# Patient Record
Sex: FEMALE | HISPANIC OR LATINO | Employment: STUDENT | URBAN - METROPOLITAN AREA
[De-identification: names, ages, dates, MRNs, and addresses within clinical notes are randomized per-mention and may not be internally consistent; named-entity substitution may affect disease eponyms.]

---

## 2024-03-22 ENCOUNTER — OFFICE VISIT (OUTPATIENT)
Dept: FAMILY MEDICINE | Facility: CLINIC | Age: 24
End: 2024-03-22
Payer: COMMERCIAL

## 2024-03-22 VITALS
RESPIRATION RATE: 16 BRPM | HEART RATE: 100 BPM | HEIGHT: 66 IN | TEMPERATURE: 99.1 F | WEIGHT: 283.4 LBS | SYSTOLIC BLOOD PRESSURE: 118 MMHG | BODY MASS INDEX: 45.55 KG/M2 | OXYGEN SATURATION: 98 % | DIASTOLIC BLOOD PRESSURE: 82 MMHG

## 2024-03-22 DIAGNOSIS — E28.2 PCOS (POLYCYSTIC OVARIAN SYNDROME): Primary | ICD-10-CM

## 2024-03-22 DIAGNOSIS — L68.0 HIRSUTISM: ICD-10-CM

## 2024-03-22 DIAGNOSIS — D50.0 IRON DEFICIENCY ANEMIA DUE TO CHRONIC BLOOD LOSS: ICD-10-CM

## 2024-03-22 DIAGNOSIS — Z23 ENCOUNTER FOR IMMUNIZATION: ICD-10-CM

## 2024-03-22 LAB
ALBUMIN SERPL BCG-MCNC: 4.3 G/DL (ref 3.5–5.2)
ALP SERPL-CCNC: 121 U/L (ref 40–150)
ALT SERPL W P-5'-P-CCNC: 31 U/L (ref 0–50)
ANION GAP SERPL CALCULATED.3IONS-SCNC: 11 MMOL/L (ref 7–15)
AST SERPL W P-5'-P-CCNC: 23 U/L (ref 0–45)
BILIRUB SERPL-MCNC: 0.7 MG/DL
BUN SERPL-MCNC: 10.1 MG/DL (ref 6–20)
CALCIUM SERPL-MCNC: 8.9 MG/DL (ref 8.6–10)
CHLORIDE SERPL-SCNC: 103 MMOL/L (ref 98–107)
CHOLEST SERPL-MCNC: 177 MG/DL
CREAT SERPL-MCNC: 0.6 MG/DL (ref 0.51–0.95)
DEPRECATED HCO3 PLAS-SCNC: 23 MMOL/L (ref 22–29)
EGFRCR SERPLBLD CKD-EPI 2021: >90 ML/MIN/1.73M2
ERYTHROCYTE [DISTWIDTH] IN BLOOD BY AUTOMATED COUNT: 18.7 % (ref 10–15)
FASTING STATUS PATIENT QL REPORTED: YES
FERRITIN SERPL-MCNC: 6 NG/ML (ref 6–175)
GLUCOSE SERPL-MCNC: 145 MG/DL (ref 70–99)
HBA1C MFR BLD: 6.1 % (ref 0–5.6)
HCT VFR BLD AUTO: 28.6 % (ref 35–47)
HDLC SERPL-MCNC: 30 MG/DL
HGB BLD-MCNC: 7.7 G/DL (ref 11.7–15.7)
IRON BINDING CAPACITY (ROCHE): 351 UG/DL (ref 240–430)
IRON SATN MFR SERPL: 4 % (ref 15–46)
IRON SERPL-MCNC: 14 UG/DL (ref 37–145)
LDLC SERPL CALC-MCNC: 114 MG/DL
MCH RBC QN AUTO: 17.3 PG (ref 26.5–33)
MCHC RBC AUTO-ENTMCNC: 26.9 G/DL (ref 31.5–36.5)
MCV RBC AUTO: 64 FL (ref 78–100)
NONHDLC SERPL-MCNC: 147 MG/DL
PLATELET # BLD AUTO: 362 10E3/UL (ref 150–450)
POTASSIUM SERPL-SCNC: 4.1 MMOL/L (ref 3.4–5.3)
PROT SERPL-MCNC: 7.2 G/DL (ref 6.4–8.3)
RBC # BLD AUTO: 4.45 10E6/UL (ref 3.8–5.2)
SHBG SERPL-SCNC: 23 NMOL/L (ref 30–135)
SODIUM SERPL-SCNC: 137 MMOL/L (ref 135–145)
TRIGL SERPL-MCNC: 165 MG/DL
TSH SERPL DL<=0.005 MIU/L-ACNC: 1.35 UIU/ML (ref 0.3–4.2)
WBC # BLD AUTO: 7.3 10E3/UL (ref 4–11)

## 2024-03-22 PROCEDURE — 83036 HEMOGLOBIN GLYCOSYLATED A1C: CPT | Performed by: FAMILY MEDICINE

## 2024-03-22 PROCEDURE — 36415 COLL VENOUS BLD VENIPUNCTURE: CPT | Performed by: FAMILY MEDICINE

## 2024-03-22 PROCEDURE — 80061 LIPID PANEL: CPT | Performed by: FAMILY MEDICINE

## 2024-03-22 PROCEDURE — 85027 COMPLETE CBC AUTOMATED: CPT | Performed by: FAMILY MEDICINE

## 2024-03-22 PROCEDURE — 80053 COMPREHEN METABOLIC PANEL: CPT | Performed by: FAMILY MEDICINE

## 2024-03-22 PROCEDURE — 91320 SARSCV2 VAC 30MCG TRS-SUC IM: CPT | Performed by: FAMILY MEDICINE

## 2024-03-22 PROCEDURE — 83550 IRON BINDING TEST: CPT | Performed by: FAMILY MEDICINE

## 2024-03-22 PROCEDURE — 90471 IMMUNIZATION ADMIN: CPT | Performed by: FAMILY MEDICINE

## 2024-03-22 PROCEDURE — 90686 IIV4 VACC NO PRSV 0.5 ML IM: CPT | Performed by: FAMILY MEDICINE

## 2024-03-22 PROCEDURE — 90480 ADMN SARSCOV2 VAC 1/ONLY CMP: CPT | Performed by: FAMILY MEDICINE

## 2024-03-22 PROCEDURE — 99204 OFFICE O/P NEW MOD 45 MIN: CPT | Mod: 25 | Performed by: FAMILY MEDICINE

## 2024-03-22 PROCEDURE — 83540 ASSAY OF IRON: CPT | Performed by: FAMILY MEDICINE

## 2024-03-22 PROCEDURE — 84270 ASSAY OF SEX HORMONE GLOBUL: CPT | Performed by: FAMILY MEDICINE

## 2024-03-22 PROCEDURE — 84403 ASSAY OF TOTAL TESTOSTERONE: CPT | Performed by: FAMILY MEDICINE

## 2024-03-22 PROCEDURE — 84443 ASSAY THYROID STIM HORMONE: CPT | Performed by: FAMILY MEDICINE

## 2024-03-22 PROCEDURE — 82728 ASSAY OF FERRITIN: CPT | Performed by: FAMILY MEDICINE

## 2024-03-22 RX ORDER — MEPERIDINE HYDROCHLORIDE 25 MG/ML
25 INJECTION INTRAMUSCULAR; INTRAVENOUS; SUBCUTANEOUS EVERY 30 MIN PRN
Status: CANCELLED | OUTPATIENT
Start: 2024-03-22

## 2024-03-22 RX ORDER — ALBUTEROL SULFATE 0.83 MG/ML
2.5 SOLUTION RESPIRATORY (INHALATION)
Status: CANCELLED | OUTPATIENT
Start: 2024-03-22

## 2024-03-22 RX ORDER — DROSPIRENONE AND ETHINYL ESTRADIOL 0.02-3(28)
1 KIT ORAL DAILY
Qty: 84 TABLET | Refills: 4 | Status: SHIPPED | OUTPATIENT
Start: 2024-03-22 | End: 2024-07-10

## 2024-03-22 RX ORDER — EPINEPHRINE 1 MG/ML
0.3 INJECTION, SOLUTION, CONCENTRATE INTRAVENOUS EVERY 5 MIN PRN
Status: CANCELLED | OUTPATIENT
Start: 2024-03-22

## 2024-03-22 RX ORDER — DIPHENHYDRAMINE HYDROCHLORIDE 50 MG/ML
50 INJECTION INTRAMUSCULAR; INTRAVENOUS
Status: CANCELLED
Start: 2024-03-22

## 2024-03-22 RX ORDER — HEPARIN SODIUM,PORCINE 10 UNIT/ML
5-20 VIAL (ML) INTRAVENOUS DAILY PRN
Status: CANCELLED | OUTPATIENT
Start: 2024-03-22

## 2024-03-22 RX ORDER — HEPARIN SODIUM (PORCINE) LOCK FLUSH IV SOLN 100 UNIT/ML 100 UNIT/ML
5 SOLUTION INTRAVENOUS
Status: CANCELLED | OUTPATIENT
Start: 2024-03-22

## 2024-03-22 RX ORDER — METHYLPREDNISOLONE SODIUM SUCCINATE 125 MG/2ML
125 INJECTION, POWDER, LYOPHILIZED, FOR SOLUTION INTRAMUSCULAR; INTRAVENOUS
Status: CANCELLED
Start: 2024-03-22

## 2024-03-22 RX ORDER — SPIRONOLACTONE 50 MG/1
50 TABLET, FILM COATED ORAL DAILY
Qty: 90 TABLET | Refills: 1 | Status: SHIPPED | OUTPATIENT
Start: 2024-03-22

## 2024-03-22 RX ORDER — ALBUTEROL SULFATE 90 UG/1
1-2 AEROSOL, METERED RESPIRATORY (INHALATION)
Status: CANCELLED
Start: 2024-03-22

## 2024-03-22 ASSESSMENT — PAIN SCALES - GENERAL: PAINLEVEL: NO PAIN (0)

## 2024-03-22 NOTE — RESULT ENCOUNTER NOTE
Dear Gina,   Your results revealed a mild elevation in your A1C. A1C is a measurement of your average blood glucose over the past 3 months. Your results are consistent with pre-diabetes. I would recommend dietary restriction of high calorie meals and avoiding simple carbohydrates to prevent progression into diabetes.     We will re-check your A1C in 3-6 months.         Best Regards  Margaret Reyes MD

## 2024-03-22 NOTE — PROGRESS NOTES
Assessment & Plan     Gina was seen today for contraception and establish care.    Diagnoses and all orders for this visit:    PCOS (polycystic ovarian syndrome)  Assessment and Plan:  Discussed polycystic ovarian syndrome with Gina Shell Ramses.   [place] criteria, two out of three of the following are required to make the diagnosis of PCOS:  ?Oligo- and/or anovulation  ?Clinical and/or biochemical signs of hyperandrogenism  ?Polycystic ovaries (by ultrasound)  other conditions that mimic PCOS must be excluded (eg, disorders that cause oligo/anovulation and/or hyperandrogenism, such as thyroid disease, nonclassic congenital adrenal hyperplasia (NCCAH), hyperprolactinemia, and androgen-secreting tumors).    Explained that polycystic ovary syndrome (PCOS) is an important cause of both menstrual irregularity and androgen excess in women. Discussed that it can cause irregular menstrual cycles, hirsutism, obesity, insulin resistance, and anovulatory infertility. Specifically explained that due to PCOS she is at increased risk of type 2 diabetes, cardiovascular disease, endometrial hyperplasia, endometrial cancer and infertility.     Counseled Gina Shell Ramses that diet and exercise for weight reduction as the first step for overweight and obese women with PCOS. Weight loss can restore ovulatory cycles and improve metabolic risk. Plan for weight loss: work on her diet, declined a referral to a nutritionist.    Discussed that oral contraceptives (OCs) are the mainstay of pharmacologic therapy for women with PCOS for managing hyperandrogenism, treating menstrual dysfunction and protecting endometrial lining. Additionally it provides contraception.     For women with PCOS who choose not to or cannot take OCs, we discussed alternative treatments for endometrial protection are intermittent or continuous progestin therapy, or a progestin-releasing intrauterine device (IUD)     Additionally discussed metformin as an  alternative that can restore ovulatory menses in approximately 50 percent of women with PCOS.     Discussed that if she is unable to conceive on her own, she benefit from medications to induce ovulation.    Patient was started on OCP and spironolactone today. Will plan to start metformin in her upcoming visit.   -     US Pelvic Complete with Transvaginal; Future  -     Hemoglobin A1c; Future  -     Testosterone Bioavailable; Future  -     CBC with platelets; Future  -     Comprehensive metabolic panel (BMP + Alb, Alk Phos, ALT, AST, Total. Bili, TP); Future  -     Ferritin; Future  -     Iron and iron binding capacity; Future  -     drospirenone-ethinyl estradiol (SLICK) 3-0.02 MG tablet; Take 1 tablet by mouth daily  -     Lipid panel reflex to direct LDL Fasting; Future  -     spironolactone (ALDACTONE) 50 MG tablet; Take 1 tablet (50 mg) by mouth daily  -     TSH with free T4 reflex; Future    Iron deficiency anemia due to chronic blood loss  Hemoglobin is 7.7mg/dl. patient is currently asymptomatic. We discussed iron infusion vs. Blood transfusion. Patient desires to proceed with iron infusion.   Plan: iron infusion vs. Blood transfusion.    -     CBC with platelets; Future  -     Ferritin; Future  -     Iron and iron binding capacity; Future    Hirsutism  -     spironolactone (ALDACTONE) 50 MG tablet; Take 1 tablet (50 mg) by mouth daily    Encounter for immunization  -     INFLUENZA VACCINE IM > 6 MONTHS VALENT IIV4 (AFLURIA/FLUZONE)  -     COVID-19 12+ (2023-24) (PFIZER)    Other orders  -     REVIEW OF HEALTH MAINTENANCE PROTOCOL ORDERS  -     PRIMARY CARE FOLLOW-UP SCHEDULING; Future\       Follow-up Visit   Expected date:  Jun 22, 2024 (Approximate)      Follow Up Appointment Details:     Follow-up with whom?: Me    Follow-Up for what?: Adult Preventive    How?: In Person                     Ines Fuentes is a 23 year old, presenting for the following health issues:  Contraception and Establish  "Care        3/22/2024     7:27 AM   Additional Questions   Roomed by Marina     History of Present Illness       Reason for visit:  PCOS and needs birth control    She eats 0-1 servings of fruits and vegetables daily.She consumes 1 sweetened beverage(s) daily.She exercises with enough effort to increase her heart rate 9 or less minutes per day.  She exercises with enough effort to increase her heart rate 3 or less days per week.   She is taking medications regularly.  23 year old who presents to the clinic to establish care. Patient desires to discuss PCOS and weight.    Menarche around age 9. Had irregular menses after. On OCP since age 12-14. Patient was on birth control for 9 years.    Stopped taking them in 2022. Initially without period for two months. Then had irregular spotting.   No acne flare ups, but she does have dark/coarse hairs on face. Has done laser hair removal in the past.     She had a significant weight gain over past few years. Has noticed weight gain despite sometimes not having much change in diet or activity.     No history of Pap or Chlamydia Screening. Never been sexually active. Declined pelvic exam today      Review of Systems  Constitutional, neuro, ENT, endocrine, pulmonary, cardiac, gastrointestinal, genitourinary, musculoskeletal, integument and psychiatric systems are negative, except as otherwise noted.      Objective    /82   Pulse 100   Temp 99.1  F (37.3  C) (Oral)   Resp 16   Ht 1.679 m (5' 6.1\")   Wt 128.5 kg (283 lb 6.4 oz)   LMP 03/01/2024 (Within Days)   SpO2 98%   Breastfeeding No   BMI 45.60 kg/m    Body mass index is 45.6 kg/m .  Physical Exam   GENERAL: alert and no distress  RESP: lungs clear to auscultation - no rales, rhonchi or wheezes  CV: regular rate and rhythm, normal S1 S2, no S3 or S4, no murmur, click or rub, no peripheral edema  SKIN: hirsitusim on back, chest, abdomen and chin.     Results for orders placed or performed in visit on 03/22/24 "   CBC with platelets     Status: Abnormal   Result Value Ref Range    WBC Count 7.3 4.0 - 11.0 10e3/uL    RBC Count 4.45 3.80 - 5.20 10e6/uL    Hemoglobin 7.7 (LL) 11.7 - 15.7 g/dL    Hematocrit 28.6 (L) 35.0 - 47.0 %    MCV 64 (L) 78 - 100 fL    MCH 17.3 (L) 26.5 - 33.0 pg    MCHC 26.9 (L) 31.5 - 36.5 g/dL    RDW 18.7 (H) 10.0 - 15.0 %    Platelet Count 362 150 - 450 10e3/uL   Hemoglobin A1c     Status: Abnormal   Result Value Ref Range    Hemoglobin A1C 6.1 (H) 0.0 - 5.6 %   Testosterone Bioavailable     Status: None (In process)    Narrative    The following orders were created for panel order Testosterone Bioavailable.  Procedure                               Abnormality         Status                     ---------                               -----------         ------                     Sex Hormone Binding Glob...[[id number]]                      In process                 Testosterone Free and Total[[id number]]                      In process                   Please view results for these tests on the individual orders.           Signed Electronically by: Margaret Reyes MD

## 2024-03-22 NOTE — RESULT ENCOUNTER NOTE
Hemoglobin is 7.7mg/dl. patient is currently asymptomatic. We discussed iron infusion vs. Blood transfusion. Patient desires to proceed with iron infusion. Treatment plan ordered.   MD Amy

## 2024-03-22 NOTE — PATIENT INSTRUCTIONS
RADIOLOGY SCHEDULING (Including Mammograms, Ultrasound, CT and MRI scans and Dexa Scans):  - [facility]: [phone]  - [facility]: [phone]  - [facility]: [phone]  - [facility]: [phone]

## 2024-03-24 LAB
TESTOST FREE SERPL-MCNC: 1.01 NG/DL
TESTOST SERPL-MCNC: 46 NG/DL (ref 8–60)

## 2024-03-25 ENCOUNTER — TELEPHONE (OUTPATIENT)
Dept: FAMILY MEDICINE | Facility: CLINIC | Age: 24
End: 2024-03-25
Payer: COMMERCIAL

## 2024-03-25 NOTE — RESULT ENCOUNTER NOTE
Dear Gina,   SHBG is low. This is consistent with your diagnosis of PCOS. As we expected, your Iron level is very low. Proceed with iron infusion.    Best Regards  Margaret Reyes MD

## 2024-03-25 NOTE — TELEPHONE ENCOUNTER
Pt calling to see what pharmacy rx sent to - seng [phone] vanessa APONTE RN  [facility]

## 2024-04-05 ENCOUNTER — ANCILLARY PROCEDURE (OUTPATIENT)
Dept: ULTRASOUND IMAGING | Facility: CLINIC | Age: 24
End: 2024-04-05
Attending: FAMILY MEDICINE
Payer: COMMERCIAL

## 2024-04-05 DIAGNOSIS — E28.2 PCOS (POLYCYSTIC OVARIAN SYNDROME): ICD-10-CM

## 2024-04-05 PROCEDURE — 76856 US EXAM PELVIC COMPLETE: CPT | Mod: GC | Performed by: STUDENT IN AN ORGANIZED HEALTH CARE EDUCATION/TRAINING PROGRAM

## 2024-04-12 ENCOUNTER — INFUSION THERAPY VISIT (OUTPATIENT)
Dept: INFUSION THERAPY | Facility: CLINIC | Age: 24
End: 2024-04-12
Attending: FAMILY MEDICINE
Payer: COMMERCIAL

## 2024-04-12 VITALS
SYSTOLIC BLOOD PRESSURE: 134 MMHG | TEMPERATURE: 97.8 F | HEART RATE: 98 BPM | RESPIRATION RATE: 16 BRPM | DIASTOLIC BLOOD PRESSURE: 91 MMHG

## 2024-04-12 DIAGNOSIS — D50.0 IRON DEFICIENCY ANEMIA DUE TO CHRONIC BLOOD LOSS: Primary | ICD-10-CM

## 2024-04-12 PROCEDURE — 258N000003 HC RX IP 258 OP 636: Performed by: FAMILY MEDICINE

## 2024-04-12 PROCEDURE — 250N000011 HC RX IP 250 OP 636: Performed by: FAMILY MEDICINE

## 2024-04-12 PROCEDURE — 96366 THER/PROPH/DIAG IV INF ADDON: CPT

## 2024-04-12 PROCEDURE — 96365 THER/PROPH/DIAG IV INF INIT: CPT

## 2024-04-12 RX ORDER — MEPERIDINE HYDROCHLORIDE 25 MG/ML
25 INJECTION INTRAMUSCULAR; INTRAVENOUS; SUBCUTANEOUS EVERY 30 MIN PRN
Status: CANCELLED | OUTPATIENT
Start: 2024-04-14

## 2024-04-12 RX ORDER — ALBUTEROL SULFATE 0.83 MG/ML
2.5 SOLUTION RESPIRATORY (INHALATION)
Status: CANCELLED | OUTPATIENT
Start: 2024-04-14

## 2024-04-12 RX ORDER — DIPHENHYDRAMINE HYDROCHLORIDE 50 MG/ML
50 INJECTION INTRAMUSCULAR; INTRAVENOUS
Status: CANCELLED
Start: 2024-04-14

## 2024-04-12 RX ORDER — METHYLPREDNISOLONE SODIUM SUCCINATE 125 MG/2ML
125 INJECTION, POWDER, LYOPHILIZED, FOR SOLUTION INTRAMUSCULAR; INTRAVENOUS
Status: CANCELLED
Start: 2024-04-14

## 2024-04-12 RX ORDER — EPINEPHRINE 1 MG/ML
0.3 INJECTION, SOLUTION INTRAMUSCULAR; SUBCUTANEOUS EVERY 5 MIN PRN
Status: CANCELLED | OUTPATIENT
Start: 2024-04-14

## 2024-04-12 RX ORDER — HEPARIN SODIUM,PORCINE 10 UNIT/ML
5-20 VIAL (ML) INTRAVENOUS DAILY PRN
Status: CANCELLED | OUTPATIENT
Start: 2024-04-14

## 2024-04-12 RX ORDER — HEPARIN SODIUM (PORCINE) LOCK FLUSH IV SOLN 100 UNIT/ML 100 UNIT/ML
5 SOLUTION INTRAVENOUS
Status: CANCELLED | OUTPATIENT
Start: 2024-04-14

## 2024-04-12 RX ORDER — ALBUTEROL SULFATE 90 UG/1
1-2 AEROSOL, METERED RESPIRATORY (INHALATION)
Status: CANCELLED
Start: 2024-04-14

## 2024-04-12 RX ADMIN — IRON SUCROSE 300 MG: 20 INJECTION, SOLUTION INTRAVENOUS at 13:33

## 2024-04-12 RX ADMIN — SODIUM CHLORIDE 250 ML: 9 INJECTION, SOLUTION INTRAVENOUS at 13:33

## 2024-04-12 ASSESSMENT — PAIN SCALES - GENERAL: PAINLEVEL: NO PAIN (0)

## 2024-04-12 NOTE — PROGRESS NOTES
Infusion Nursing Note:  Gina Pearce presents today for venofer 1/3.    Patient seen by provider today: No   present during visit today: Not Applicable.    Note: N/A.      Intravenous Access:  Peripheral IV placed.    Treatment Conditions:  Not Applicable.      Post Infusion Assessment:  Patient tolerated infusion without incident.  Site patent and intact, free from redness, edema or discomfort.  No evidence of extravasations.  Access discontinued per protocol.       Discharge Plan:   Patient and/or family verbalized understanding of discharge instructions and all questions answered.  AVS to patient via [employer].  Patient will return 4/15 for next appointment.   Patient discharged in stable condition accompanied by: self.  Departure Mode: Ambulatory.      Ladonna Walden RN

## 2024-04-15 ENCOUNTER — INFUSION THERAPY VISIT (OUTPATIENT)
Dept: INFUSION THERAPY | Facility: CLINIC | Age: 24
End: 2024-04-15
Payer: COMMERCIAL

## 2024-04-15 VITALS
DIASTOLIC BLOOD PRESSURE: 87 MMHG | HEART RATE: 103 BPM | TEMPERATURE: 97.9 F | SYSTOLIC BLOOD PRESSURE: 139 MMHG | RESPIRATION RATE: 16 BRPM

## 2024-04-15 DIAGNOSIS — D50.0 IRON DEFICIENCY ANEMIA DUE TO CHRONIC BLOOD LOSS: Primary | ICD-10-CM

## 2024-04-15 PROCEDURE — 258N000003 HC RX IP 258 OP 636: Performed by: FAMILY MEDICINE

## 2024-04-15 PROCEDURE — 96365 THER/PROPH/DIAG IV INF INIT: CPT

## 2024-04-15 PROCEDURE — 250N000011 HC RX IP 250 OP 636: Performed by: FAMILY MEDICINE

## 2024-04-15 PROCEDURE — 96366 THER/PROPH/DIAG IV INF ADDON: CPT

## 2024-04-15 RX ORDER — MEPERIDINE HYDROCHLORIDE 25 MG/ML
25 INJECTION INTRAMUSCULAR; INTRAVENOUS; SUBCUTANEOUS EVERY 30 MIN PRN
Status: CANCELLED | OUTPATIENT
Start: 2024-04-16

## 2024-04-15 RX ORDER — ALBUTEROL SULFATE 0.83 MG/ML
2.5 SOLUTION RESPIRATORY (INHALATION)
Status: CANCELLED | OUTPATIENT
Start: 2024-04-16

## 2024-04-15 RX ORDER — ALBUTEROL SULFATE 90 UG/1
1-2 AEROSOL, METERED RESPIRATORY (INHALATION)
Status: CANCELLED
Start: 2024-04-16

## 2024-04-15 RX ORDER — METHYLPREDNISOLONE SODIUM SUCCINATE 125 MG/2ML
125 INJECTION, POWDER, LYOPHILIZED, FOR SOLUTION INTRAMUSCULAR; INTRAVENOUS
Status: CANCELLED
Start: 2024-04-16

## 2024-04-15 RX ORDER — HEPARIN SODIUM (PORCINE) LOCK FLUSH IV SOLN 100 UNIT/ML 100 UNIT/ML
5 SOLUTION INTRAVENOUS
Status: CANCELLED | OUTPATIENT
Start: 2024-04-16

## 2024-04-15 RX ORDER — DIPHENHYDRAMINE HYDROCHLORIDE 50 MG/ML
50 INJECTION INTRAMUSCULAR; INTRAVENOUS
Status: CANCELLED
Start: 2024-04-16

## 2024-04-15 RX ORDER — EPINEPHRINE 1 MG/ML
0.3 INJECTION, SOLUTION INTRAMUSCULAR; SUBCUTANEOUS EVERY 5 MIN PRN
Status: CANCELLED | OUTPATIENT
Start: 2024-04-16

## 2024-04-15 RX ORDER — HEPARIN SODIUM,PORCINE 10 UNIT/ML
5-20 VIAL (ML) INTRAVENOUS DAILY PRN
Status: CANCELLED | OUTPATIENT
Start: 2024-04-16

## 2024-04-15 RX ADMIN — IRON SUCROSE 300 MG: 20 INJECTION, SOLUTION INTRAVENOUS at 13:49

## 2024-04-15 RX ADMIN — SODIUM CHLORIDE 250 ML: 9 INJECTION, SOLUTION INTRAVENOUS at 13:49

## 2024-04-15 ASSESSMENT — PAIN SCALES - GENERAL: PAINLEVEL: NO PAIN (0)

## 2024-04-15 NOTE — PROGRESS NOTES
Infusion Nursing Note:  Gina Pearce presents today for venofer 2/3.    Patient seen by provider today: No   present during visit today: Not Applicable.    Note: pt denies any changes in health since last visit. Reports tolerating first dose of iron without any issues.      Intravenous Access:  Peripheral IV placed.    Treatment Conditions:  Not Applicable.      Post Infusion Assessment:  Patient tolerated infusion without incident.  Site patent and intact, free from redness, edema or discomfort.  No evidence of extravasations.  Access discontinued per protocol.       Discharge Plan:   Patient and/or family verbalized understanding of discharge instructions and all questions answered.  AVS to patient via [employer].  Patient will return 4/18 for next appointment.   Patient discharged in stable condition accompanied by: self.  Departure Mode: Ambulatory.      Ladonna Walden RN

## 2024-04-18 ENCOUNTER — INFUSION THERAPY VISIT (OUTPATIENT)
Dept: INFUSION THERAPY | Facility: CLINIC | Age: 24
End: 2024-04-18
Attending: FAMILY MEDICINE
Payer: COMMERCIAL

## 2024-04-18 VITALS
SYSTOLIC BLOOD PRESSURE: 129 MMHG | DIASTOLIC BLOOD PRESSURE: 83 MMHG | HEART RATE: 83 BPM | OXYGEN SATURATION: 98 % | RESPIRATION RATE: 18 BRPM | TEMPERATURE: 97.4 F

## 2024-04-18 DIAGNOSIS — D50.0 IRON DEFICIENCY ANEMIA DUE TO CHRONIC BLOOD LOSS: Primary | ICD-10-CM

## 2024-04-18 PROCEDURE — 96365 THER/PROPH/DIAG IV INF INIT: CPT

## 2024-04-18 PROCEDURE — 258N000003 HC RX IP 258 OP 636: Performed by: FAMILY MEDICINE

## 2024-04-18 PROCEDURE — 96366 THER/PROPH/DIAG IV INF ADDON: CPT

## 2024-04-18 PROCEDURE — 250N000011 HC RX IP 250 OP 636: Performed by: FAMILY MEDICINE

## 2024-04-18 RX ORDER — HEPARIN SODIUM,PORCINE 10 UNIT/ML
5-20 VIAL (ML) INTRAVENOUS DAILY PRN
OUTPATIENT
Start: 2024-04-19

## 2024-04-18 RX ORDER — ALBUTEROL SULFATE 0.83 MG/ML
2.5 SOLUTION RESPIRATORY (INHALATION)
OUTPATIENT
Start: 2024-04-19

## 2024-04-18 RX ORDER — HEPARIN SODIUM (PORCINE) LOCK FLUSH IV SOLN 100 UNIT/ML 100 UNIT/ML
5 SOLUTION INTRAVENOUS
OUTPATIENT
Start: 2024-04-19

## 2024-04-18 RX ORDER — MEPERIDINE HYDROCHLORIDE 25 MG/ML
25 INJECTION INTRAMUSCULAR; INTRAVENOUS; SUBCUTANEOUS EVERY 30 MIN PRN
OUTPATIENT
Start: 2024-04-19

## 2024-04-18 RX ORDER — DIPHENHYDRAMINE HYDROCHLORIDE 50 MG/ML
50 INJECTION INTRAMUSCULAR; INTRAVENOUS
Start: 2024-04-19

## 2024-04-18 RX ORDER — ALBUTEROL SULFATE 90 UG/1
1-2 AEROSOL, METERED RESPIRATORY (INHALATION)
Start: 2024-04-19

## 2024-04-18 RX ORDER — METHYLPREDNISOLONE SODIUM SUCCINATE 125 MG/2ML
125 INJECTION, POWDER, LYOPHILIZED, FOR SOLUTION INTRAMUSCULAR; INTRAVENOUS
Start: 2024-04-19

## 2024-04-18 RX ORDER — EPINEPHRINE 1 MG/ML
0.3 INJECTION, SOLUTION INTRAMUSCULAR; SUBCUTANEOUS EVERY 5 MIN PRN
OUTPATIENT
Start: 2024-04-19

## 2024-04-18 RX ADMIN — SODIUM CHLORIDE 250 ML: 9 INJECTION, SOLUTION INTRAVENOUS at 12:12

## 2024-04-18 RX ADMIN — IRON SUCROSE 300 MG: 20 INJECTION, SOLUTION INTRAVENOUS at 12:12

## 2024-04-18 NOTE — PROGRESS NOTES
Infusion Nursing Note:  Gina Pearce presents today for Venofer 300 mg 3 of 3.    Patient seen by provider today: No   present during visit today: Not Applicable.    Note: N/A.      Intravenous Access:  Peripheral IV placed.    Treatment Conditions:  Not Applicable.      Post Infusion Assessment:  Patient tolerated infusion without incident.  Blood return noted pre and post infusion.  Site patent and intact, free from redness, edema or discomfort.  No evidence of extravasations.  Access discontinued per protocol.       Discharge Plan:   Patient declined prescription refills.  Discharge instructions reviewed with: Patient.  Patient and/or family verbalized understanding of discharge instructions and all questions answered.  AVS to patient via [facility].    Patient discharged in stable condition accompanied by: self.  Departure Mode: Ambulatory.      Chandler Howard RN

## 2024-05-19 ENCOUNTER — HEALTH MAINTENANCE LETTER (OUTPATIENT)
Age: 24
End: 2024-05-19

## 2024-07-10 ENCOUNTER — OFFICE VISIT (OUTPATIENT)
Dept: FAMILY MEDICINE | Facility: CLINIC | Age: 24
End: 2024-07-10
Payer: COMMERCIAL

## 2024-07-10 VITALS
RESPIRATION RATE: 16 BRPM | DIASTOLIC BLOOD PRESSURE: 87 MMHG | BODY MASS INDEX: 43.07 KG/M2 | SYSTOLIC BLOOD PRESSURE: 115 MMHG | WEIGHT: 268 LBS | OXYGEN SATURATION: 99 % | HEIGHT: 66 IN | HEART RATE: 98 BPM | TEMPERATURE: 97.3 F

## 2024-07-10 DIAGNOSIS — D50.8 IRON DEFICIENCY ANEMIA SECONDARY TO INADEQUATE DIETARY IRON INTAKE: ICD-10-CM

## 2024-07-10 DIAGNOSIS — Z12.4 CERVICAL CANCER SCREENING: ICD-10-CM

## 2024-07-10 DIAGNOSIS — Z11.59 NEED FOR HEPATITIS C SCREENING TEST: ICD-10-CM

## 2024-07-10 DIAGNOSIS — N93.8 DUB (DYSFUNCTIONAL UTERINE BLEEDING): ICD-10-CM

## 2024-07-10 DIAGNOSIS — E28.2 PCOS (POLYCYSTIC OVARIAN SYNDROME): Primary | ICD-10-CM

## 2024-07-10 DIAGNOSIS — R73.03 PREDIABETES: ICD-10-CM

## 2024-07-10 DIAGNOSIS — Z11.4 SCREENING FOR HIV (HUMAN IMMUNODEFICIENCY VIRUS): ICD-10-CM

## 2024-07-10 LAB
CHOLEST SERPL-MCNC: 183 MG/DL
ERYTHROCYTE [DISTWIDTH] IN BLOOD BY AUTOMATED COUNT: 19.3 % (ref 10–15)
FASTING STATUS PATIENT QL REPORTED: YES
FERRITIN SERPL-MCNC: 6 NG/ML (ref 6–175)
HBA1C MFR BLD: 6 % (ref 0–5.6)
HCT VFR BLD AUTO: 30.7 % (ref 35–47)
HCV AB SERPL QL IA: NONREACTIVE
HDLC SERPL-MCNC: 36 MG/DL
HGB BLD-MCNC: 8.6 G/DL (ref 11.7–15.7)
HIV 1+2 AB+HIV1 P24 AG SERPL QL IA: NONREACTIVE
IRON BINDING CAPACITY (ROCHE): 401 UG/DL (ref 240–430)
IRON SATN MFR SERPL: 5 % (ref 15–46)
IRON SERPL-MCNC: 20 UG/DL (ref 37–145)
LDLC SERPL CALC-MCNC: 114 MG/DL
MCH RBC QN AUTO: 18.9 PG (ref 26.5–33)
MCHC RBC AUTO-ENTMCNC: 28 G/DL (ref 31.5–36.5)
MCV RBC AUTO: 68 FL (ref 78–100)
NONHDLC SERPL-MCNC: 147 MG/DL
PLATELET # BLD AUTO: 413 10E3/UL (ref 150–450)
RBC # BLD AUTO: 4.54 10E6/UL (ref 3.8–5.2)
RETICS # AUTO: 0.12 10E6/UL (ref 0.03–0.1)
RETICS/RBC NFR AUTO: 2.6 % (ref 0.5–2)
TRIGL SERPL-MCNC: 164 MG/DL
WBC # BLD AUTO: 6.1 10E3/UL (ref 4–11)

## 2024-07-10 PROCEDURE — 86803 HEPATITIS C AB TEST: CPT | Performed by: FAMILY MEDICINE

## 2024-07-10 PROCEDURE — G2211 COMPLEX E/M VISIT ADD ON: HCPCS | Performed by: FAMILY MEDICINE

## 2024-07-10 PROCEDURE — 80061 LIPID PANEL: CPT | Performed by: FAMILY MEDICINE

## 2024-07-10 PROCEDURE — 85027 COMPLETE CBC AUTOMATED: CPT | Performed by: FAMILY MEDICINE

## 2024-07-10 PROCEDURE — 36415 COLL VENOUS BLD VENIPUNCTURE: CPT | Performed by: FAMILY MEDICINE

## 2024-07-10 PROCEDURE — 82728 ASSAY OF FERRITIN: CPT | Performed by: FAMILY MEDICINE

## 2024-07-10 PROCEDURE — 99214 OFFICE O/P EST MOD 30 MIN: CPT | Performed by: FAMILY MEDICINE

## 2024-07-10 PROCEDURE — 85045 AUTOMATED RETICULOCYTE COUNT: CPT | Performed by: FAMILY MEDICINE

## 2024-07-10 PROCEDURE — 87389 HIV-1 AG W/HIV-1&-2 AB AG IA: CPT | Performed by: FAMILY MEDICINE

## 2024-07-10 PROCEDURE — 83550 IRON BINDING TEST: CPT | Performed by: FAMILY MEDICINE

## 2024-07-10 PROCEDURE — 83036 HEMOGLOBIN GLYCOSYLATED A1C: CPT | Performed by: FAMILY MEDICINE

## 2024-07-10 PROCEDURE — 83540 ASSAY OF IRON: CPT | Performed by: FAMILY MEDICINE

## 2024-07-10 RX ORDER — DROSPIRENONE AND ETHINYL ESTRADIOL 0.02-3(28)
1 KIT ORAL DAILY
Qty: 84 TABLET | Refills: 4 | Status: SHIPPED | OUTPATIENT
Start: 2024-07-10 | End: 2024-08-20

## 2024-07-10 RX ORDER — METFORMIN HCL 500 MG
500 TABLET, EXTENDED RELEASE 24 HR ORAL
Qty: 90 TABLET | Refills: 3 | Status: SHIPPED | OUTPATIENT
Start: 2024-07-10

## 2024-07-10 RX ORDER — FERROUS GLUCONATE 324(38)MG
324 TABLET ORAL
Qty: 90 TABLET | Refills: 3 | Status: SHIPPED | OUTPATIENT
Start: 2024-07-10 | End: 2024-08-20

## 2024-07-10 ASSESSMENT — PAIN SCALES - GENERAL: PAINLEVEL: NO PAIN (0)

## 2024-07-10 NOTE — RESULT ENCOUNTER NOTE
Dear Gina,   Your hemoglobin improved from 7.7-8.6.  Please continue with iron supplement your hemoglobin A1c continues to be elevated.  This is consistent with prediabetes.    Earlier today we agreed on starting metformin.     Please schedule a follow-up in 3 to 6 months    Best Regards  Margaret Reyes MD

## 2024-07-10 NOTE — PROGRESS NOTES
Assessment & Plan     Gina was seen today for anemia and pcos.    Diagnoses and all orders for this visit:    DUB (dysfunctional uterine bleeding)  PCOS (polycystic ovarian syndrome)  Frequency of bleeding improved significantly after starting a combined oral contraceptive.  Other options were discussed including hormonal IUD but patient is not interested in that at this time.  She desires to continue with her current prescription.  -     Hemoglobin A1c; Future  -     Lipid panel reflex to direct LDL Fasting; Future  -     metFORMIN (GLUCOPHAGE XR) 500 MG 24 hr tablet; Take 1 tablet (500 mg) by mouth daily (with dinner)  -     drospirenone-ethinyl estradiol (SLICK) 3-0.02 MG tablet; Take 1 tablet by mouth daily  -     ferrous gluconate (FERGON) 324 (38 Fe) MG tablet; Take 1 tablet (324 mg) by mouth daily (with breakfast)    Iron deficiency anemia secondary to inadequate dietary iron intake  In our previous visit, patient had severe iron deficiency anemia.  She underwent iron infusions today's labs revealed an improvement in her hemoglobin level..  I advised her to continue with iron supplements.  -     Ferritin; Future  -     Iron and iron binding capacity; Future  -     CBC with platelets; Future  -     Reticulocyte count; Future  -     ferrous gluconate (FERGON) 324 (38 Fe) MG tablet; Take 1 tablet (324 mg) by mouth daily (with breakfast)    Prediabetes  We had an extended discussion regarding PCOS, prediabetes and type 2 diabetes.  We mutually agreed to start a low-dose metformin today.  I would recommend increasing her dose to 1000 mg once daily or 500 mg XR 2 times daily.  -     Hemoglobin A1c; Future  -     Lipid panel reflex to direct LDL Fasting; Future  -     metFORMIN (GLUCOPHAGE XR) 500 MG 24 hr tablet; Take 1 tablet (500 mg) by mouth daily (with dinner)    Screening for HIV (human immunodeficiency virus)  -     HIV Antigen Antibody Combo; Future    Need for hepatitis C screening test  -     Hepatitis C  "Screen Reflex to HCV RNA Quant and Genotype; Future    Cervical cancer screening  Patient desires to return to clinic for cervical cancer screening.    BMI  Estimated body mass index is 43.26 kg/m  as calculated from the following:    Height as of this encounter: 1.676 m (5' 6\").    Weight as of this encounter: 121.6 kg (268 lb).     Ines Fuentes is a 24 year old, presenting for the following health issues:  Anemia and PCOS        7/10/2024     9:34 AM   Additional Questions   Roomed by Bib VALLADARES     History of Present Illness       Reason for visit:  Follow up from last visit regarding iron deficiency and pcos    She eats 2-3 servings of fruits and vegetables daily.She consumes 0 sweetened beverage(s) daily.She exercises with enough effort to increase her heart rate 10 to 19 minutes per day.  She exercises with enough effort to increase her heart rate 3 or less days per week.   She is taking medications regularly.     Patient presented to clinic previously with complains of heavy persistent menstrual cycles secondary to PCOS. Patient was started the oral contraceptive. She had three weeks of amenorrhea, followed by another week of bleeding despite taking the oral contraceptive.  Frequency of bleeding and amount of bleeding decreased significantly since starting OCPs.    Previously it was persistent heavy bleeding constantly.     FDLMP: 06/26/2024 lasted for 2 weeks (one heavy flow and another week of spotting).     Also, patient was diagnosed with iron deficiency anemia.  She underwent multiple iron infusions and reports moderate increase in her energy level.      Review of Systems  Constitutional, neuro, ENT, endocrine, pulmonary, cardiac, gastrointestinal, genitourinary, musculoskeletal, integument and psychiatric systems are negative, except as otherwise noted.      Objective    /87   Pulse 98   Temp 97.3  F (36.3  C) (Temporal)   Resp 16   Ht 1.676 m (5' 6\")   Wt 121.6 kg (268 lb)   LMP " 06/26/2024 (Within Days)   SpO2 99%   BMI 43.26 kg/m    Body mass index is 43.26 kg/m .  Physical Exam   GENERAL: alert and no distress  EYES: Eyes grossly normal to inspection, PERRL and conjunctivae and sclerae normal  HENT: ear canals and TM's normal, nose and mouth without ulcers or lesions  NECK: no adenopathy, no asymmetry, masses, or scars  RESP: lungs clear to auscultation - no rales, rhonchi or wheezes  CV: regular rate and rhythm, normal S1 S2, no S3 or S4, no murmur, click or rub, no peripheral edema  ABDOMEN: soft, nontender, no hepatosplenomegaly, no masses and bowel sounds normal  MS: no gross musculoskeletal defects noted, no edema  SKIN: no suspicious lesions or rashes  NEURO: Normal strength and tone, mentation intact and speech normal  PSYCH: mentation appears normal, affect normal/bright    Results for orders placed or performed in visit on 07/10/24   Hemoglobin A1c     Status: Abnormal   Result Value Ref Range    Hemoglobin A1C 6.0 (H) 0.0 - 5.6 %   CBC with platelets     Status: Abnormal   Result Value Ref Range    WBC Count 6.1 4.0 - 11.0 10e3/uL    RBC Count 4.54 3.80 - 5.20 10e6/uL    Hemoglobin 8.6 (L) 11.7 - 15.7 g/dL    Hematocrit 30.7 (L) 35.0 - 47.0 %    MCV 68 (L) 78 - 100 fL    MCH 18.9 (L) 26.5 - 33.0 pg    MCHC 28.0 (L) 31.5 - 36.5 g/dL    RDW 19.3 (H) 10.0 - 15.0 %    Platelet Count 413 150 - 450 10e3/uL           Signed Electronically by: Margaret Reyes MD

## 2024-07-10 NOTE — LETTER
August 12, 2024      Gina Shell Ramses  [address]  [facility] [address]        Dear Ms.Rivera Pearce,    We are writing to inform you of your test results.    We sent you a TUTORize message but you have not viewed it yet. Here is a copy of the [employer]OR[employer] message and your lab results.     Results for orders placed or performed in visit on 07/10/24   HIV Antigen Antibody Combo     Status: Normal   Result Value Ref Range    HIV Antigen Antibody Combo Nonreactive Nonreactive   Hepatitis C Screen Reflex to HCV RNA Quant and Genotype     Status: Normal   Result Value Ref Range    Hepatitis C Antibody Nonreactive Nonreactive   Hemoglobin A1c     Status: Abnormal   Result Value Ref Range    Hemoglobin A1C 6.0 (H) 0.0 - 5.6 %   Ferritin     Status: Normal   Result Value Ref Range    Ferritin 6 6 - 175 ng/mL   Iron and iron binding capacity     Status: Abnormal   Result Value Ref Range    Iron 20 (L) 37 - 145 ug/dL    Iron Binding Capacity 401 240 - 430 ug/dL    Iron Sat Index 5 (L) 15 - 46 %   CBC with platelets     Status: Abnormal   Result Value Ref Range    WBC Count 6.1 4.0 - 11.0 10e3/uL    RBC Count 4.54 3.80 - 5.20 10e6/uL    Hemoglobin 8.6 (L) 11.7 - 15.7 g/dL    Hematocrit 30.7 (L) 35.0 - 47.0 %    MCV 68 (L) 78 - 100 fL    MCH 18.9 (L) 26.5 - 33.0 pg    MCHC 28.0 (L) 31.5 - 36.5 g/dL    RDW 19.3 (H) 10.0 - 15.0 %    Platelet Count 413 150 - 450 10e3/uL   Reticulocyte count     Status: Abnormal   Result Value Ref Range    % Reticulocyte 2.6 (H) 0.5 - 2.0 %    Absolute Reticulocyte 0.117 (H) 0.025 - 0.095 10e6/uL   Lipid panel reflex to direct LDL Fasting     Status: Abnormal   Result Value Ref Range    Cholesterol 183 <200 mg/dL    Triglycerides 164 (H) <150 mg/dL    Direct Measure HDL 36 (L) >=50 mg/dL    LDL Cholesterol Calculated 114 (H) <=100 mg/dL    Non HDL Cholesterol 147 (H) <130 mg/dL    Patient Fasting > 8hrs? Yes     Narrative    Cholesterol  Desirable:  <200 mg/dL    Triglycerides  Normal:   Less than 150 mg/dL  Borderline High:  150-199 mg/dL  High:  200-499 mg/dL  Very High:  Greater than or equal to 500 mg/dL    Direct Measure HDL  Female:  Greater than or equal to 50 mg/dL   Male:  Greater than or equal to 40 mg/dL    LDL Cholesterol  Desirable:  <100mg/dL  Above Desirable:  100-129 mg/dL   Borderline High:  130-159 mg/dL   High:  160-189 mg/dL   Very High:  >= 190 mg/dL    Non HDL Cholesterol  Desirable:  130 mg/dL  Above Desirable:  130-159 mg/dL  Borderline High:  160-189 mg/dL  High:  190-219 mg/dL  Very High:  Greater than or equal to 220 mg/dL         If you have any questions or concerns, please call the clinic at the number listed above.       Sincerely,      Margaret Reyes MD

## 2024-07-10 NOTE — PATIENT INSTRUCTIONS
Iron Deficiency   Shoot for foods with highly concentrated sources of iron.  As you may know, you absorb up to 30 percent of heme iron, found only in animal tissues (meat, poultry, and fish). You absorb 2-10 percent of non-heme iron, found in plant foods as well as meat.  Eating meat generally boost your iron levels far more than eating non-heme iron. When you eat heme iron with other sources of non-heme iron, the iron is more completely absorbed. Foods high in vitamin C, like tomatoes, citrus fruits and red, yellow and orange peppers can also help with the absorption of non-heme iron.  As far as supplements go, he best tolerated iron supplements in my practice typically are iron containing children's chewable or gummy vitamins (Candy!).  Also additional oral B12 and Folic acid supplements can help you absorb iron better.     It is going to take at least a month to build your blood supply back up a significant amount.  You could come back in for a recheck then.    Iron Rich Foods  Food has two types of iron -- heme and non-heme iron. Heme iron is found in meat, fish and poultry. It is the form of iron that is most readily absorbed by your body. You absorb up to 30 percent of the heme iron that you consume. Eating meat generally boosts your iron levels far more than eating non-heme iron.  Non-heme iron is found in plant-based foods such as fruits, vegetables and nuts. Foods with non-heme iron are still an important part of a nutritious, well-balanced diet, but the iron contained in these foods won t be absorbed as completely. You absorb between two and 10 percent of the non-heme iron that you consume.  When you eat heme iron with foods higher in non-heme iron, the iron will be more completely absorbed by your body. Foods high in vitamin C - like tomatoes, citrus fruits and red, yellow and orange peppers - can also help with the absorption of non-heme iron.  The amount and type of iron in your diet is important. Some  iron-rich foods are:  Meat and Eggs  Beef   Lamb   Ham   Turkey   Chicken   Veal   Pork   Dried beef   Liver   Liverwurst   Eggs (any style)  Seafood  Shrimp   Clams   Scallops   Oysters   Tuna   Sardines   Yusuf   Mackerel  Vegetables  Spinach   Sweet potatoes   Peas   Broccoli   String beans   Beet greens   Dandelion greens   Collards   Kale   Chard  Bread and Cereals  White bread (enriched)   Whole wheat bread   Enriched pasta   Wheat products   Bran cereals   Corn meal   Oat cereal   Cream of Wheat   Rye bread   Enriched rice  Fruit  Strawberries   Watermelon   Raisins   Dates   Figs   Prunes   Prune juice   Dried apricots   Dried peaches  Beans and Other Foods  Tofu   Beans (kidney, garbanzo, or white, canned)   Tomato products (e.g., paste)   Dried peas   Dried beans   Lentils   Instant breakfast   Corn syrup   Maple syrup   Molasses

## 2024-07-11 NOTE — RESULT ENCOUNTER NOTE
Dear Gina,   Your iron level and your iron saturation are very low.  Let me know if you would like to proceed with another iron infusion and I will place orders in your chart.    Best Regards  Margaret Reyes MD

## 2024-08-20 ENCOUNTER — TELEPHONE (OUTPATIENT)
Dept: FAMILY MEDICINE | Facility: CLINIC | Age: 24
End: 2024-08-20
Payer: COMMERCIAL

## 2024-08-20 DIAGNOSIS — E28.2 PCOS (POLYCYSTIC OVARIAN SYNDROME): ICD-10-CM

## 2024-08-20 DIAGNOSIS — D50.8 IRON DEFICIENCY ANEMIA SECONDARY TO INADEQUATE DIETARY IRON INTAKE: ICD-10-CM

## 2024-08-20 RX ORDER — FERROUS GLUCONATE 324(38)MG
324 TABLET ORAL
Qty: 90 TABLET | Refills: 2 | Status: SHIPPED | OUTPATIENT
Start: 2024-08-20

## 2024-08-20 RX ORDER — DROSPIRENONE AND ETHINYL ESTRADIOL 0.02-3(28)
1 KIT ORAL DAILY
Qty: 84 TABLET | Refills: 2 | Status: SHIPPED | OUTPATIENT
Start: 2024-08-20

## 2024-08-20 NOTE — TELEPHONE ENCOUNTER
Change in pharmacy request. Resent remaining refills on file.     Karthikeyan VOALLE RN 8/20/2024 at 3:36 PM

## 2024-08-20 NOTE — TELEPHONE ENCOUNTER
Patient calling in requesting refills for Leilani. Patient would like to update pharmacy on file to [facility] - [id number], [place] [id number] location.     Dyan   Lead

## 2024-10-11 ENCOUNTER — MYC REFILL (OUTPATIENT)
Dept: FAMILY MEDICINE | Facility: CLINIC | Age: 24
End: 2024-10-11
Payer: COMMERCIAL

## 2024-10-11 DIAGNOSIS — E28.2 PCOS (POLYCYSTIC OVARIAN SYNDROME): ICD-10-CM

## 2024-10-11 DIAGNOSIS — L68.0 HIRSUTISM: ICD-10-CM

## 2024-10-11 RX ORDER — SPIRONOLACTONE 50 MG/1
50 TABLET, FILM COATED ORAL DAILY
Qty: 90 TABLET | Refills: 1 | OUTPATIENT
Start: 2024-10-11

## 2024-10-11 RX ORDER — SPIRONOLACTONE 50 MG/1
50 TABLET, FILM COATED ORAL DAILY
Qty: 90 TABLET | Refills: 0 | Status: SHIPPED | OUTPATIENT
Start: 2024-10-11

## 2024-11-15 ENCOUNTER — OFFICE VISIT (OUTPATIENT)
Dept: FAMILY MEDICINE | Facility: CLINIC | Age: 24
End: 2024-11-15
Payer: COMMERCIAL

## 2024-11-15 VITALS
WEIGHT: 271 LBS | SYSTOLIC BLOOD PRESSURE: 123 MMHG | BODY MASS INDEX: 43.55 KG/M2 | DIASTOLIC BLOOD PRESSURE: 84 MMHG | OXYGEN SATURATION: 97 % | RESPIRATION RATE: 16 BRPM | HEIGHT: 66 IN | TEMPERATURE: 96.9 F | HEART RATE: 86 BPM

## 2024-11-15 DIAGNOSIS — D50.8 IRON DEFICIENCY ANEMIA SECONDARY TO INADEQUATE DIETARY IRON INTAKE: ICD-10-CM

## 2024-11-15 DIAGNOSIS — L68.0 HIRSUTISM: ICD-10-CM

## 2024-11-15 DIAGNOSIS — Z12.4 CERVICAL CANCER SCREENING: ICD-10-CM

## 2024-11-15 DIAGNOSIS — E28.2 PCOS (POLYCYSTIC OVARIAN SYNDROME): ICD-10-CM

## 2024-11-15 DIAGNOSIS — E11.9 TYPE 2 DIABETES MELLITUS WITHOUT COMPLICATION, WITHOUT LONG-TERM CURRENT USE OF INSULIN (H): ICD-10-CM

## 2024-11-15 DIAGNOSIS — Z23 ENCOUNTER FOR IMMUNIZATION: ICD-10-CM

## 2024-11-15 DIAGNOSIS — Z00.00 ENCOUNTER FOR PREVENTATIVE ADULT HEALTH CARE EXAMINATION: Primary | ICD-10-CM

## 2024-11-15 LAB
ERYTHROCYTE [DISTWIDTH] IN BLOOD BY AUTOMATED COUNT: 15.3 % (ref 10–15)
EST. AVERAGE GLUCOSE BLD GHB EST-MCNC: 146 MG/DL
FERRITIN SERPL-MCNC: 34 NG/ML (ref 6–175)
HBA1C MFR BLD: 6.7 % (ref 0–5.6)
HCT VFR BLD AUTO: 41.4 % (ref 35–47)
HGB BLD-MCNC: 13.4 G/DL (ref 11.7–15.7)
MCH RBC QN AUTO: 23.4 PG (ref 26.5–33)
MCHC RBC AUTO-ENTMCNC: 32.4 G/DL (ref 31.5–36.5)
MCV RBC AUTO: 72 FL (ref 78–100)
PLATELET # BLD AUTO: 334 10E3/UL (ref 150–450)
RBC # BLD AUTO: 5.73 10E6/UL (ref 3.8–5.2)
WBC # BLD AUTO: 7.8 10E3/UL (ref 4–11)

## 2024-11-15 PROCEDURE — 85027 COMPLETE CBC AUTOMATED: CPT | Performed by: FAMILY MEDICINE

## 2024-11-15 PROCEDURE — 90656 IIV3 VACC NO PRSV 0.5 ML IM: CPT | Performed by: FAMILY MEDICINE

## 2024-11-15 PROCEDURE — 83036 HEMOGLOBIN GLYCOSYLATED A1C: CPT | Performed by: FAMILY MEDICINE

## 2024-11-15 PROCEDURE — 99395 PREV VISIT EST AGE 18-39: CPT | Mod: 25 | Performed by: FAMILY MEDICINE

## 2024-11-15 PROCEDURE — 82728 ASSAY OF FERRITIN: CPT | Performed by: FAMILY MEDICINE

## 2024-11-15 PROCEDURE — 99214 OFFICE O/P EST MOD 30 MIN: CPT | Mod: 25 | Performed by: FAMILY MEDICINE

## 2024-11-15 PROCEDURE — 90480 ADMN SARSCOV2 VAC 1/ONLY CMP: CPT | Performed by: FAMILY MEDICINE

## 2024-11-15 PROCEDURE — 90471 IMMUNIZATION ADMIN: CPT | Performed by: FAMILY MEDICINE

## 2024-11-15 PROCEDURE — 36415 COLL VENOUS BLD VENIPUNCTURE: CPT | Performed by: FAMILY MEDICINE

## 2024-11-15 PROCEDURE — 91320 SARSCV2 VAC 30MCG TRS-SUC IM: CPT | Performed by: FAMILY MEDICINE

## 2024-11-15 SDOH — HEALTH STABILITY: PHYSICAL HEALTH: ON AVERAGE, HOW MANY DAYS PER WEEK DO YOU ENGAGE IN MODERATE TO STRENUOUS EXERCISE (LIKE A BRISK WALK)?: 3 DAYS

## 2024-11-15 SDOH — HEALTH STABILITY: PHYSICAL HEALTH: ON AVERAGE, HOW MANY MINUTES DO YOU ENGAGE IN EXERCISE AT THIS LEVEL?: 10 MIN

## 2024-11-15 ASSESSMENT — SOCIAL DETERMINANTS OF HEALTH (SDOH): HOW OFTEN DO YOU GET TOGETHER WITH FRIENDS OR RELATIVES?: THREE TIMES A WEEK

## 2024-11-15 ASSESSMENT — PAIN SCALES - GENERAL: PAINLEVEL_OUTOF10: NO PAIN (0)

## 2024-11-15 NOTE — NURSING NOTE
Patient is in the supine position.   The body was positioned using the following devices: safety strap and gel pad mattress.  The head was positioned using the following devices: self-adhering foam and elevation pillow.  The left arm was positioned using the following devices: arm board and gel arm pads.  The right arm was positioned using the following devices: arm board and gel arm pads.   Pt received flu vaccine and COVID vaccine per Dr. Reyes's orders. Pt given VIS forms prior to immunization administration.   Prior to immunization administration, verified patients identity using patient s name and date of birth.   Patient instructed to remain in clinic for 15 minutes afterwards, and to report any adverse reactions.     Performed by Terrance Gregory RN on 11/15/2024.

## 2024-11-15 NOTE — PROGRESS NOTES
"  Assessment & Plan     {Diag Picklist:554534}    BMI  Estimated body mass index is 43.74 kg/m  as calculated from the following:    Height as of this encounter: 1.676 m (5' 6\").    Weight as of this encounter: 122.9 kg (271 lb).     Ines Fuentes is a 24 year old, presenting for the following health issues:  Diabetes and Anemia        11/15/2024     1:52 PM   Additional Questions   Roomed by Bib VALLADARES     History of Present Illness       Reason for visit:  Follow up visit for iron deficiency and pre-diabetes    She eats 0-1 servings of fruits and vegetables daily.She consumes 1 sweetened beverage(s) daily.She exercises with enough effort to increase her heart rate 10 to 19 minutes per day.  She exercises with enough effort to increase her heart rate 4 days per week. She is missing 7 dose(s) of medications per week.  She is not taking prescribed medications regularly due to other.      Had a cycle in 10/2024. Last for 6 days. Medium, no clots.   Never been sexually active.     Review of Systems  Constitutional, HEENT, cardiovascular, pulmonary, gi and gu systems are negative, except as otherwise noted.      Objective    /84   Pulse 86   Temp 96.9  F (36.1  C) (Temporal)   Resp 16   Ht 1.676 m (5' 6\")   Wt 122.9 kg (271 lb)   LMP 10/15/2024 (Within Days)   SpO2 97%   BMI 43.74 kg/m    Body mass index is 43.74 kg/m .  Physical Exam   {Exam List (Optional):800528}    No results found for any visits on 11/15/24.        Signed Electronically by: Margaret Reyes MD    "

## 2024-11-15 NOTE — PROGRESS NOTES
Preventive Care Visit  [facility]  Margaret Reyes MD, Family Medicine  Nov 15, 2024      Assessment & Plan   Gina was seen today for diabetes, anemia and physical.    Diagnoses and all orders for this visit:    Encounter for preventative adult health care examination  -     CBC with platelets; Future  -     Ferritin; Future  -     Hemoglobin A1c; Future    Type 2 diabetes mellitus without complication, without long-term current use of insulin (H)  Patient has a new diagnosis of type 2 diabetes.  We restarted her previous prescription for metformin.  Encourage patient to titrate it to 2000 mg once daily.  She was referred to diabetic educator.  I advised her to schedule a follow-up to discuss referrals for diabetic eye exam, pneumococcal vaccine and foot exam..  -     Ferritin; Future  -     Adult Diabetes Education  Referral; Future  -     blood glucose monitoring (NO BRAND SPECIFIED) meter device kit; Use to test blood sugar 1 times daily or as directed. Preferred blood glucose meter OR supplies to accompany: Blood Glucose Monitor Brands: per insurance.  -     blood glucose (NO BRAND SPECIFIED) test strip; Use to test blood sugar 1 times daily or as directed. To accompany: Blood Glucose Monitor Brands: per insurance.  -     thin (NO BRAND SPECIFIED) lancets; Use with lanceting device. To accompany: Blood Glucose Monitor Brands: per insurance.  -     Albumin Random Urine Quantitative with Creat Ratio; Future  -     metFORMIN (GLUCOPHAGE XR) 500 MG 24 hr tablet; Take 1 tablet (500 mg) by mouth daily for 7 days, THEN 2 tablets (1,000 mg) daily for 7 days, THEN 3 tablets (1,500 mg) daily for 7 days, THEN 4 tablets (2,000 mg) daily.    Iron deficiency anemia secondary to inadequate dietary iron intake  -     ferrous gluconate (FERGON) 324 (38 Fe) MG tablet; Take 1 tablet (324 mg) by mouth daily (with breakfast).    Cervical cancer screening  Patient improved and sexually active.   "Declined Pap smear today.    Encounter for immunization  -     INFLUENZA VACCINE,SPLIT VIRUS,TRIVALENT,PF(FLUZONE)  -     COVID-19 12+ (PFIZER)    PCOS (polycystic ovarian syndrome)  -     ferrous gluconate (FERGON) 324 (38 Fe) MG tablet; Take 1 tablet (324 mg) by mouth daily (with breakfast).  -     spironolactone (ALDACTONE) 50 MG tablet; Take 1 tablet (50 mg) by mouth daily.    Hirsutism  -     spironolactone (ALDACTONE) 50 MG tablet; Take 1 tablet (50 mg) by mouth daily.    Other orders  -     PRIMARY CARE FOLLOW-UP SCHEDULING; Future      Follow-up:     Follow-up Visit   Expected date:  Nov 24, 2024 (Approximate)      Follow Up Appointment Details:     Follow-up with whom?: Me    Follow-Up for what?: Chronic Disease f/u    Chronic Disease f/u: Diabetes    How?: In Person or Virtual                BMI  Estimated body mass index is 43.74 kg/m  as calculated from the following:    Height as of this encounter: 1.676 m (5' 6\").    Weight as of this encounter: 122.9 kg (271 lb).     Counseling  Appropriate preventive services were addressed with this patient via screening, questionnaire, or discussion as appropriate for fall prevention, nutrition, physical activity, Tobacco-use cessation, social engagement, weight loss and cognition.  Checklist reviewing preventive services available has been given to the patient.  Reviewed patient's diet, addressing concerns and/or questions.   She is at risk for lack of exercise and has been provided with information to increase physical activity for the benefit of her well-being.   The patient was instructed to see the dentist every 6 months.     Ines Fuentes is a 24 year old, presenting for the following:  Diabetes, Anemia, and Physical        11/15/2024     1:52 PM   Additional Questions   Roomed by Bib FR[place]    Health Care Directive  Patient does not have a Health Care Directive: Discussed advance care planning with patient; however, patient declined at this " time.      11/15/2024   General Health   How would you rate your overall physical health? Good   Feel stress (tense, anxious, or unable to sleep) Only a little      (!) STRESS CONCERN      11/15/2024   Nutrition   Three or more servings of calcium each day? Yes   Diet: Regular (no restrictions)   How many servings of fruit and vegetables per day? (!) 0-1   How many sweetened beverages each day? 0-1            11/15/2024   Exercise   Days per week of moderate/strenous exercise 3 days   Average minutes spent exercising at this level 10 min            11/15/2024   Social Factors   Frequency of gathering with friends or relatives Three times a week   Worry food won't last until get money to buy more No   Food not last or not have enough money for food? No   Do you have housing? (Housing is defined as stable permanent housing and does not include staying ouside in a car, in a tent, in an abandoned building, in an overnight shelter, or couch-surfing.) Yes   Are you worried about losing your housing? No   Lack of transportation? No   Unable to get utilities (heat,electricity)? No            11/15/2024   Dental   Dentist two times every year? (!) NO            11/15/2024   TB Screening   Were you born outside of the [place]? Yes              11/15/2024   Substance Use   Alcohol more than 3/day or more than 7/wk No   Do you use any other substances recreationally? No        Social History     Tobacco Use    Smoking status: Never    Smokeless tobacco: Never   Vaping Use    Vaping status: Never Used   Substance Use Topics    Alcohol use: Yes     Comment: Socially    Drug use: Never             11/15/2024   STI Screening   New sexual partner(s) since last STI/HIV test? No        History of abnormal Pap smear: No - age 21-29 PAP every 3 years recommended             11/15/2024   Contraception/Family Planning   Questions about contraception or family planning No           Reviewed and updated as needed this visit by Provider   Celine  " Allergies  Meds  Problems  Med Hx  Surg Hx  Fam Hx              Review of Systems  Constitutional, neuro, ENT, endocrine, pulmonary, cardiac, gastrointestinal, genitourinary, musculoskeletal, integument and psychiatric systems are negative, except as otherwise noted.     Objective    Exam  /84   Pulse 86   Temp 96.9  F (36.1  C) (Temporal)   Resp 16   Ht 1.676 m (5' 6\")   Wt 122.9 kg (271 lb)   LMP 10/15/2024 (Within Days)   SpO2 97%   BMI 43.74 kg/m     Estimated body mass index is 43.74 kg/m  as calculated from the following:    Height as of this encounter: 1.676 m (5' 6\").    Weight as of this encounter: 122.9 kg (271 lb).    Physical Exam  GENERAL: alert and no distress  EYES: Eyes grossly normal to inspection, PERRL and conjunctivae and sclerae normal  HENT: ear canals and TM's normal, nose and mouth without ulcers or lesions  NECK: no adenopathy, no asymmetry, masses, or scars  RESP: lungs clear to auscultation - no rales, rhonchi or wheezes  CV: regular rate and rhythm, normal S1 S2, no S3 or S4, no murmur, click or rub, no peripheral edema  ABDOMEN: soft, nontender, no hepatosplenomegaly, no masses and bowel sounds normal  MS: no gross musculoskeletal defects noted, no edema  SKIN: no suspicious lesions or rashes  NEURO: Normal strength and tone, mentation intact and speech normal  PSYCH: mentation appears normal, affect normal/bright        Signed Electronically by: Margaret Reyes MD    "

## 2024-11-17 PROBLEM — E11.9 DIABETES MELLITUS, TYPE 2 (H): Status: ACTIVE | Noted: 2024-11-17

## 2024-11-17 RX ORDER — LANCETS
EACH MISCELLANEOUS
Qty: 100 EACH | Refills: 6 | Status: SHIPPED | OUTPATIENT
Start: 2024-11-17

## 2024-11-17 RX ORDER — SPIRONOLACTONE 50 MG/1
50 TABLET, FILM COATED ORAL DAILY
Qty: 90 TABLET | Refills: 0 | Status: SHIPPED | OUTPATIENT
Start: 2024-11-17

## 2024-11-17 RX ORDER — FERROUS GLUCONATE 324(38)MG
324 TABLET ORAL
Qty: 90 TABLET | Refills: 2 | Status: SHIPPED | OUTPATIENT
Start: 2024-11-17

## 2024-11-17 RX ORDER — METFORMIN HYDROCHLORIDE 500 MG/1
TABLET, EXTENDED RELEASE ORAL
Qty: 360 TABLET | Refills: 0 | Status: SHIPPED | OUTPATIENT
Start: 2024-11-17 | End: 2025-02-15

## 2024-11-17 NOTE — RESULT ENCOUNTER NOTE
Team please call patient.  Assist patient with scheduling   an office visit to discuss new diagnosis of diabetes.  DESHAWN    Dear Gina,   Here are your recent results:  Your hemoglobin A1c was elevated at 6.7%.  This indicates that you have a new diagnosis of type 2 diabetes.  I placed a referral to our diabetic educator, restarted your old prescription for metformin.  We have additional labs, vaccines and screening tests that we need to discuss.  I would recommend scheduling an office visit when you are a chance.      Your iron level was acceptable.  Continue with over-the-counter iron supplements.    Best Regards  Margaret Reyes MD

## 2024-11-18 ENCOUNTER — TELEPHONE (OUTPATIENT)
Dept: FAMILY MEDICINE | Facility: CLINIC | Age: 24
End: 2024-11-18
Payer: COMMERCIAL

## 2024-11-25 ENCOUNTER — OFFICE VISIT (OUTPATIENT)
Dept: FAMILY MEDICINE | Facility: CLINIC | Age: 24
End: 2024-11-25
Payer: COMMERCIAL

## 2024-11-25 ENCOUNTER — TELEPHONE (OUTPATIENT)
Dept: FAMILY MEDICINE | Facility: CLINIC | Age: 24
End: 2024-11-25

## 2024-11-25 VITALS
RESPIRATION RATE: 16 BRPM | WEIGHT: 270 LBS | BODY MASS INDEX: 43.39 KG/M2 | OXYGEN SATURATION: 97 % | SYSTOLIC BLOOD PRESSURE: 120 MMHG | DIASTOLIC BLOOD PRESSURE: 82 MMHG | TEMPERATURE: 97 F | HEIGHT: 66 IN | HEART RATE: 107 BPM

## 2024-11-25 DIAGNOSIS — E66.01 MORBID OBESITY (H): ICD-10-CM

## 2024-11-25 DIAGNOSIS — E11.9 TYPE 2 DIABETES MELLITUS WITHOUT COMPLICATION, WITHOUT LONG-TERM CURRENT USE OF INSULIN (H): Primary | ICD-10-CM

## 2024-11-25 DIAGNOSIS — E11.69 TYPE 2 DIABETES MELLITUS WITH OTHER SPECIFIED COMPLICATION, WITHOUT LONG-TERM CURRENT USE OF INSULIN (H): Primary | ICD-10-CM

## 2024-11-25 PROBLEM — E61.1 IRON DEFICIENCY: Status: ACTIVE | Noted: 2022-07-13

## 2024-11-25 PROBLEM — L68.0 HIRSUTISM: Status: ACTIVE | Noted: 2022-07-13

## 2024-11-25 PROBLEM — E28.2 PCOS (POLYCYSTIC OVARIAN SYNDROME): Status: ACTIVE | Noted: 2022-07-13

## 2024-11-25 LAB
ALBUMIN SERPL BCG-MCNC: 4.4 G/DL (ref 3.5–5.2)
ALP SERPL-CCNC: 93 U/L (ref 40–150)
ALT SERPL W P-5'-P-CCNC: 44 U/L (ref 0–50)
ANION GAP SERPL CALCULATED.3IONS-SCNC: 12 MMOL/L (ref 7–15)
AST SERPL W P-5'-P-CCNC: 64 U/L (ref 0–45)
BILIRUB SERPL-MCNC: 0.6 MG/DL
BUN SERPL-MCNC: 9.2 MG/DL (ref 6–20)
CALCIUM SERPL-MCNC: 9.7 MG/DL (ref 8.8–10.4)
CHLORIDE SERPL-SCNC: 105 MMOL/L (ref 98–107)
CREAT SERPL-MCNC: 0.59 MG/DL (ref 0.51–0.95)
CREAT UR-MCNC: 179 MG/DL
EGFRCR SERPLBLD CKD-EPI 2021: >90 ML/MIN/1.73M2
GLUCOSE SERPL-MCNC: 90 MG/DL (ref 70–99)
HCO3 SERPL-SCNC: 23 MMOL/L (ref 22–29)
MICROALBUMIN UR-MCNC: 114 MG/L
MICROALBUMIN/CREAT UR: 63.69 MG/G CR (ref 0–25)
POTASSIUM SERPL-SCNC: 4.2 MMOL/L (ref 3.4–5.3)
PROT SERPL-MCNC: 7.8 G/DL (ref 6.4–8.3)
SODIUM SERPL-SCNC: 140 MMOL/L (ref 135–145)

## 2024-11-25 PROCEDURE — 82043 UR ALBUMIN QUANTITATIVE: CPT | Performed by: FAMILY MEDICINE

## 2024-11-25 PROCEDURE — 99207 PR FOOT EXAM NO CHARGE: CPT | Performed by: FAMILY MEDICINE

## 2024-11-25 PROCEDURE — 90677 PCV20 VACCINE IM: CPT | Performed by: FAMILY MEDICINE

## 2024-11-25 PROCEDURE — 90471 IMMUNIZATION ADMIN: CPT | Performed by: FAMILY MEDICINE

## 2024-11-25 PROCEDURE — 82570 ASSAY OF URINE CREATININE: CPT | Performed by: FAMILY MEDICINE

## 2024-11-25 PROCEDURE — 99214 OFFICE O/P EST MOD 30 MIN: CPT | Mod: 25 | Performed by: FAMILY MEDICINE

## 2024-11-25 PROCEDURE — 36415 COLL VENOUS BLD VENIPUNCTURE: CPT | Performed by: FAMILY MEDICINE

## 2024-11-25 PROCEDURE — 80053 COMPREHEN METABOLIC PANEL: CPT | Performed by: FAMILY MEDICINE

## 2024-11-25 RX ORDER — LANCETS
EACH MISCELLANEOUS
Qty: 100 EACH | Refills: 6 | Status: SHIPPED | OUTPATIENT
Start: 2024-11-25

## 2024-11-25 RX ORDER — TIRZEPATIDE 2.5 MG/.5ML
2.5 INJECTION, SOLUTION SUBCUTANEOUS
Qty: 2 ML | Refills: 0 | Status: SHIPPED | OUTPATIENT
Start: 2024-11-25

## 2024-11-25 ASSESSMENT — PAIN SCALES - GENERAL: PAINLEVEL_OUTOF10: NO PAIN (0)

## 2024-11-25 NOTE — NURSING NOTE
Prior to immunization administration, verified patients identity using patient s name and date of birth. Please see Immunization Activity for additional information.     Screening Questionnaire for Adult Immunization    Are you sick today?   No   Do you have allergies to medications, food, a vaccine component or latex?   No   Have you ever had a serious reaction after receiving a vaccination?   No   Do you have a long-term health problem with heart, lung, kidney, or metabolic disease (e.g., diabetes), asthma, a blood disorder, no spleen, complement component deficiency, a cochlear implant, or a spinal fluid leak?  Are you on long-term aspirin therapy?   No   Do you have cancer, leukemia, HIV/AIDS, or any other immune system problem?   No   Do you have a parent, brother, or sister with an immune system problem?   No   In the past 3 months, have you taken medications that affect  your immune system, such as prednisone, other steroids, or anticancer drugs; drugs for the treatment of rheumatoid arthritis, Crohn s disease, or psoriasis; or have you had radiation treatments?   No   Have you had a seizure, or a brain or other nervous system problem?   No   During the past year, have you received a transfusion of blood or blood    products, or been given immune (gamma) globulin or antiviral drug?   No   For women: Are you pregnant or is there a chance you could become       pregnant during the next month?   No   Have you received any vaccinations in the past 4 weeks?   No     Immunization questionnaire answers were all negative.      Patient instructed to remain in clinic for 15 minutes afterwards, and to report any adverse reactions.     Screening performed by Lillie Perez on 11/25/2024 at 12:27 PM.

## 2024-11-25 NOTE — TELEPHONE ENCOUNTER
Prior Authorization Retail Medication Request    Medication/Dose: MOUNJARO 2.5 MG/0.5ML SOAJ   Diagnosis and ICD code (if different than what is on RX):    Type 2 diabetes mellitus without complication, without long-term current use of insulin (H) [E11.9]  - Primary      Morbid obesity (H) [E66.01]        New/renewal/insurance change PA/secondary ins. PA:  Previously Tried and Failed:  unknown  Rationale:  unknown    Insurance   Primary: [employer]  Insurance ID:  [id number]       Clinic Information  Preferred routing pool for dept communication: [facility]

## 2024-11-25 NOTE — PROGRESS NOTES
Assessment & Plan     Gina was seen today for diabetes and anemia.    Diagnoses and all orders for this visit:    Type 2 diabetes mellitus without complication, without long-term current use of insulin (H)  Assessment: 24 year old female for follow up of diabetes. Acute symptoms: None.  Diabetic Review of Systems - Diabetic ROS: no polyuria or polydipsia, no chest pain, dyspnea or TIA's, no numbness, tingling or pain in extremities, Last eye exam approximately 4 months ago. Advised her to proceed with a diabetic eye exam next year.   PLAN: Per [facility] orders. Issues reviewed with her: referral to Diabetic Education department and diabetic diet discussed in detail, written exchange diet given.-     Albumin Random Urine Quantitative with Creat Ratio; Future  -     Comprehensive metabolic panel (BMP + Alb, Alk Phos, ALT, AST, Total. Bili, TP); Future  -     FOOT EXAM  -     Adult Eye  Referral; Future  -     blood glucose (NO BRAND SPECIFIED) test strip; Use to test blood sugar 1 times daily or as directed. To accompany: Blood Glucose Monitor Brands: per insurance.  -     blood glucose monitoring (NO BRAND SPECIFIED) meter device kit; Use to test blood sugar 1 times daily or as directed. Preferred blood glucose meter OR supplies to accompany: Blood Glucose Monitor Brands: per insurance.  -     thin (NO BRAND SPECIFIED) lancets; Use with lanceting device. To accompany: Blood Glucose Monitor Brands: per insurance.  -     MOUNJARO 2.5 MG/0.5ML SOAJ; Inject 0.5 mLs (2.5 mg) subcutaneously every 7 days.    Morbid obesity (H)  -     MOUNJARO 2.5 MG/0.5ML SOAJ; Inject 0.5 mLs (2.5 mg) subcutaneously every 7 days.    Other orders  -     Pneumococcal 20 Valent Conjugate (Prevnar 20)  -     PRIMARY CARE FOLLOW-UP SCHEDULING; Future        Follow-up:     Follow-up Visit   Expected date:  Feb 25, 2025 (Approximate)      Follow Up Appointment Details:     Follow-up with whom?: Me    Follow-Up for what?: Chronic Disease  "f/u    Chronic Disease f/u: Diabetes Comment - diabetes and weight management    How?: In Person or Virtual                   BMI  Estimated body mass index is 43.58 kg/m  as calculated from the following:    Height as of this encounter: 1.676 m (5' 6\").    Weight as of this encounter: 122.5 kg (270 lb).     Ines Fuentes is a 24 year old, presenting for the following health issues:  Diabetes and Anemia        11/25/2024    11:42 AM   Additional Questions   Roomed by Bib VALLADARES     History of Present Illness       Reason for visit:  Follow up visit for iron deficiency and pre-diabetes    She eats 0-1 servings of fruits and vegetables daily.She consumes 1 sweetened beverage(s) daily.She exercises with enough effort to increase her heart rate 10 to 19 minutes per day.  She exercises with enough effort to increase her heart rate 4 days per week. She is missing 7 dose(s) of medications per week.  She is not taking prescribed medications regularly due to other.   \      Review of Systems  Constitutional, HEENT, cardiovascular, pulmonary, gi and gu systems are negative, except as otherwise noted.      Objective    /82   Pulse 107   Temp 97  F (36.1  C) (Temporal)   Resp 16   Ht 1.676 m (5' 6\")   Wt 122.5 kg (270 lb)   LMP 11/24/2024 (Exact Date)   SpO2 97%   BMI 43.58 kg/m    Body mass index is 43.58 kg/m .  Physical Exam   GENERAL: alert and no distress  EYES: Eyes grossly normal to inspection, PERRL and conjunctivae and sclerae normal  HENT: ear canals and TM's normal, nose and mouth without ulcers or lesions  NECK: no adenopathy, no asymmetry, masses, or scars  RESP: lungs clear to auscultation - no rales, rhonchi or wheezes  CV: regular rate and rhythm, normal S1 S2, no S3 or S4, no murmur, click or rub, no peripheral edema  ABDOMEN: soft, nontender, no hepatosplenomegaly, no masses and bowel sounds normal  MS: no gross musculoskeletal defects noted, no edema  SKIN: no suspicious lesions or " rashes  NEURO: Normal strength and tone, mentation intact and speech normal  PSYCH: mentation appears normal, affect normal/bright  Diabetic foot exam: normal DP and PT pulses, no trophic changes or ulcerative lesions, and normal sensory exam    No results found for any visits on 11/25/24.        Signed Electronically by: Margaret Reyes MD

## 2024-11-27 NOTE — TELEPHONE ENCOUNTER
PA Initiation    Medication: MOUNJARO 2.5 MG/0.5ML SC SOAJ  Insurance Company: [employer] - Phone [phone] Fax [phone]  Pharmacy Filling the Rx: [facility][id number] [place], [place] - [facility] [address] AT [facility] [address]  Filling Pharmacy Phone: [phone]  Filling Pharmacy Fax: [phone]  Start Date: 11/27/2024

## 2024-11-29 NOTE — TELEPHONE ENCOUNTER
PRIOR AUTHORIZATION DENIED    Medication: MOUNJARO 2.5 MG/0.5ML SC SOAJ    Insurance Company: [employer] - Phone [phone] Fax [phone]    Denial Date: 11/28/2024    Denial Reason(s): Patient needs to try and fail Victoza, Byduereon BCise and Ozempic.

## 2024-12-01 PROBLEM — N18.1 CKD (CHRONIC KIDNEY DISEASE) STAGE 1, GFR 90 ML/MIN OR GREATER: Status: ACTIVE | Noted: 2024-12-01

## 2024-12-01 PROBLEM — R74.01 TRANSAMINITIS: Status: ACTIVE | Noted: 2024-12-01

## 2024-12-03 ENCOUNTER — TELEPHONE (OUTPATIENT)
Dept: FAMILY MEDICINE | Facility: CLINIC | Age: 24
End: 2024-12-03
Payer: COMMERCIAL

## 2024-12-03 NOTE — TELEPHONE ENCOUNTER
Prior Authorization Retail Medication Request    Medication/Dose: Ozempic 0.25/2mg/3ml  Diagnosis and ICD code (if different than what is on RX):  E11.69  New/renewal/insurance change PA/secondary ins. PA:  Previously Tried and Failed:    Rationale:      Insurance [place]  Primary:   Insurance ID:  [id number]    Secondary (if applicable):  Insurance ID:      Pharmacy Information (if different than what is on RX)  Name:   [id number] [place] [address] [address] AT [facility] [address] [facility] [address]     Phone:    Fax:    Clinic Information  Preferred routing pool for dept communication:

## 2024-12-03 NOTE — LETTER
2024    INSURER: Payor: [employer] / Plan: [employer] / Product Type: Indemnity /   Re: Prior Authorization Request  Patient: Gina Pearce  Policy ID#:  [id number]  : 2000      To Whom It May Concern,    Re: Medical Necessity for Ozempic (semaglutide)    I am writing to appeal the denial of coverage for Ozempic (semaglutide) for my patient, Gina Pearce, who has been diagnosed with type 2 diabetes mellitus. While her hemoglobin A1C is currently below 7%, it is critical to emphasize the necessity of Ozempic in managing her condition to prevent long-term complications and to maintain optimal glycemic control.    Medical Background  Gina Pearce has a history of type 2 diabetes complicated by obesity, hyperlipidemia, PCOS and hirsutism. Her current regimen includes metformin with all its GI side-effects, which has helped maintain her A1C below 7%. However, the stability of her diabetes control is due in large part to Ozempic, which has demonstrated efficacy not only in lowering A1C but also in addressing other systemic risks associated with diabetes.    Importance of Continued Ozempic Therapy  Prevention of Long-term Complications:  Even with an A1C below 7%, patients with type 2 diabetes remain at significant risk of complications such as cardiovascular disease, chronic kidney disease, and diabetic retinopathy. Ozempic has shown a robust benefit in reducing cardiovascular risk, as evidenced in the SUSTAIN-6 trial, which demonstrated a 26% reduction in major adverse cardiovascular events in patients treated with semaglutide.    Stability and Durability of Glycemic Control:  Diabetes is a progressive disease, and maintaining tight glycemic control is essential to slowing its progression. Without Ozempic, Gina Pearce is at risk of glycemic variability and potential worsening of her condition, which could lead to further medication intensification or  even insulin dependence.    Weight Management and Insulin Sensitivity:  Gina Pearce also has class II morbid obesity. Ozempic is uniquely beneficial in promoting weight loss, which improves insulin sensitivity and reduces systemic inflammation, factors that are crucial in preventing diabetes-related complications.    Evidence Supporting Use of Ozempic  The American Diabetes Association (ADA) guidelines recommend GLP-1 receptor agonists, such as Ozempic, for patients with type 2 diabetes, particularly those with cardiovascular risk factors, regardless of baseline A1C. The benefits extend beyond glycemic control, emphasizing reduction in cardiovascular events and renal protection.    Request for Reconsideration  Given the significant systemic benefits of Ozempic and the need to maintain optimal control of Gina Pearce's diabetes to prevent costly and debilitating complications, I strongly urge you to reconsider the denial of this medication. The long-term health outcomes and cost savings associated with its use far outweigh the initial cost of the medication.    I am happy to provide additional information or discuss this matter further to support this appeal. Please do not hesitate to contact me directly at [phone].    Thank you for your prompt attention to this matter.    Sincerely,  Margaret Reyes MD    Please send your written decision to me at this address:  [facility]  [address] [place] [address], [address]  [facility] [address] [phone]  Dept: [phone]

## 2024-12-04 NOTE — TELEPHONE ENCOUNTER
PA Initiation    Medication: OZEMPIC (0.25 OR 0.5 MG/DOSE) 2 [facility]ML [facility]  Insurance Company: [employer] - Phone [phone] Fax [phone]  Pharmacy Filling the Rx: [id number] [place], [place] - [address] [facility] AND [address]  Filling Pharmacy Phone: [phone]  Filling Pharmacy Fax:    Start Date: 12/4/2024  Retail Pharmacy Prior Authorization Team   Phone: [phone]

## 2024-12-06 NOTE — TELEPHONE ENCOUNTER
PRIOR AUTHORIZATION DENIED    Medication: OZEMPIC (0.25 OR 0.5 MG/DOSE) [facility]  Insurance Company: [employer] - Phone [phone] Fax [phone]  Denial Date: 12/5/2024  Denial Reason(s):   A1C must be greater than 7.    Appeal Information:     Patient Notified: The clinic should notify the patient of the denial.

## 2024-12-09 NOTE — TELEPHONE ENCOUNTER
Medication Appeal Initiation    Medication: OZEMPIC (0.25 OR 0.5 MG/DOSE) 2 MG/3ML SC [facility]  Appeal Start Date:  12/9/2024  Insurance Company: [employer]  Insurance Phone: [phone]  Insurance Fax: [phone]  Comments:

## 2024-12-11 ENCOUNTER — VIRTUAL VISIT (OUTPATIENT)
Dept: EDUCATION SERVICES | Facility: CLINIC | Age: 24
End: 2024-12-11
Attending: FAMILY MEDICINE
Payer: COMMERCIAL

## 2024-12-11 DIAGNOSIS — E11.9 TYPE 2 DIABETES MELLITUS WITHOUT COMPLICATION, WITHOUT LONG-TERM CURRENT USE OF INSULIN (H): ICD-10-CM

## 2024-12-11 PROCEDURE — 98968 PH1 ASSMT&MGMT NQHP 21-30: CPT | Mod: 93 | Performed by: DIETITIAN, REGISTERED

## 2024-12-11 NOTE — PROGRESS NOTES
Diabetes Self-Management Education & Support    Presents for: Individual review    Type of Service: Telephone Visit    Originating Location (Patient Location): Home  Distant Location (Provider Location): Offsite  Mode of Communication:  Telephone    Telephone Visit Start Time:  2:31 PM  Telephone Visit End Time (telephone visit stop time): 2:52 PM    How would patient like to obtain AVS? Tam      ASSESSMENT:    Met with Gina who is 1 week away from graduating from [employer]. She is agreeable to group classes, but is unable to schedule at this time as she will be traveling and be out of state for an unknown amount of time. Follow up order placed for her to schedule when able.  Pt has a glucose meter - has not started using it - discussed goals.  Also discussed metformin, patient is working on increasing this to therapeutic goal of 2,000 mg/day. She is in the process of an appeal for Ozempic.    Reviewed food choices, portion sizes, small weight loss and increased activity - she plans to work on all of this once school is out of session.    Patient's most recent   Lab Results   Component Value Date    A1C 6.7 11/15/2024     is meeting goal of <7.0    Diabetes knowledge and skills assessment:   Patient is knowledgeable in diabetes management concepts related to: Healthy Eating    Continue education with the following diabetes management concepts: Healthy Eating, Being Active, Monitoring, Taking Medication, Problem Solving, Reducing Risks, and Healthy Coping    Based on learning assessment above, most appropriate setting for further diabetes education would be: Group class setting.      PLAN  Schedule group class once back in [place]  Continue metformin and increase to goal of 2,000 mg/day  Start checking glucose once a day  Topics to cover at upcoming visits: Healthy Eating, Being Active, Monitoring, Taking Medication, Problem Solving, Reducing Risks, and Healthy Coping    Follow-up: 2-3 months    See Care Plan for  "co-developed, patient-state behavior change goals.  AVS provided for patient today.    Education Materials Provided:  Mailed information via email on new diagnosis of diabetes      SUBJECTIVE/OBJECTIVE:  Presents for: Individual review  Accompanied by: Self  Diabetes education in the past 24mo: No  Focus of Visit: Patient Unsure  Diabetes type: Type 2  Date of diagnosis: November 2024  Disease course: Stable  How confident are you filling out medical forms by yourself:: Extremely  Transportation concerns: No  Difficulty affording diabetes medication?: No  Difficulty affording diabetes testing supplies?: No  Other concerns:: None  Cultural Influences/Ethnic Background:   or       Diabetes Symptoms & Complications:  Diabetes Related Symptoms: None  Symptom course: Stable  Disease course: Stable  Complications assessed today?: No    Patient Problem List and Family Medical History reviewed for relevant medical history, current medical status, and diabetes risk factors.    Vitals:  LMP 11/24/2024 (Exact Date)   Estimated body mass index is 43.58 kg/m  as calculated from the following:    Height as of 11/25/24: 1.676 m (5' 6\").    Weight as of 11/25/24: 122.5 kg (270 lb).   Last 3 BP:   BP Readings from Last 3 Encounters:   11/25/24 120/82   11/15/24 123/84   07/10/24 115/87       History   Smoking Status    Never   Smokeless Tobacco    Never       Labs:  Lab Results   Component Value Date    A1C 6.7 11/15/2024     Lab Results   Component Value Date    GLC 90 11/25/2024     Lab Results   Component Value Date     07/10/2024     Direct Measure HDL   Date Value Ref Range Status   07/10/2024 36 (L) >=50 mg/dL Final   ]  GFR Estimate   Date Value Ref Range Status   11/25/2024 >90 >60 mL/min/1.73m2 Final     Comment:     eGFR calculated using 2021 CKD-EPI equation.     No results found for: \"GFRESTBLACK\"  Lab Results   Component Value Date    CR 0.59 11/25/2024     No results found for: " "\"MICROALBUMIN\"    Healthy Eating:  Healthy Eating Assessed Today: Yes  Beverages: Water    Being Active:  Being Active Assessed Today: No  Exercise:: Currently not exercising    Monitoring:  Monitoring Assessed Today: No  Did patient bring glucose meter to appointment? : Yes  Blood Glucose Meter: Unknown  Times checking blood sugar at home (number): Never        Taking Medications:  Diabetes Medication(s)       Biguanides       metFORMIN (GLUCOPHAGE XR) 500 MG 24 hr tablet Take 1 tablet (500 mg) by mouth daily for 7 days, THEN 2 tablets (1,000 mg) daily for 7 days, THEN 3 tablets (1,500 mg) daily for 7 days, THEN 4 tablets (2,000 mg) daily.       Incretin Mimetic Agents       MOUNJARO 2.5 MG/0.5ML SOAJ Inject 0.5 mLs (2.5 mg) subcutaneously every 7 days.     Patient not taking: Reported on 12/11/2024     semaglutide (OZEMPIC) 2 MG/3ML pen Inject 0.25 mg subcutaneously every 7 days for 28 days, THEN 0.5 mg every 7 days for 14 days.     Patient not taking: Reported on 12/11/2024            Taking Medication Assessed Today: Yes  Current Treatments: Oral Medication (taken by mouth)  Problems taking diabetes medications regularly?: No  Diabetes medication side effects?: No    Problem Solving:  Problem Solving Assessed Today: No              Reducing Risks:  Reducing Risks Assessed Today: No    Healthy Coping:  Healthy Coping Assessed Today: Yes  Emotional response to diabetes: Ready to learn  Stage of change: ACTION (Actively working towards change)  Patient Activation Measure Survey Score:       No data to display                  Care Plan and Education Provided:  Healthy Eating: Balanced meals and Portion control, Being Active: Finding a physical activity routine that works for you, Taking Medication: Side effects of prescribed medication(s) and When to take medication(s), and Healthy Coping: Benefits of making appropriate lifestyle changes      Time Spent: 21 minutes  Encounter Type: Individual    Any diabetes " medication dose changes were made via the CDE Protocol per the patient's referring provider. A copy of this encounter was shared with the provider.

## 2024-12-11 NOTE — PATIENT INSTRUCTIONS
Thank you for visiting me today regarding your new diagnosis of type 2 diabetes     You can control diabetes  You can prevent or delay the progression of diabetes with healthy lifestyle choices.  1.  Exercise 30 minutes a day, at least five times per week (or 150 minutes of exercise per week).    2.  Lose weight if you need to. If you are overweight, losing just 5 to 10% of your body weight (an  average of 15 pounds) may reduce your risk.    3. Cut back calories in your diet. Choose healthy, nutritious, and unprocessed foods (lean meats, plant proteins, whole grains, fruits and vegetables).    4.  Recheck A1C in the next 3-6 months.    5. You may need medication to delay the progression of diabetes. Continue to follow up with your diabetes educator or primary care provider.    6. If you are interested in starting to check blood sugars with a glucometer please reach out.    7. You are welcome to schedule a follow up with me (your diabetes educator)  anytime by calling the triage/scheduling line to help with lifestyle changes    Healthy Eating:  Plate method eating: fill half your plate with non-starchy vegetables, 1/4 plate with lean protein, 1/4 plate with carbohydrates (think whole grains/fruit/starchy vegetables/bean or legumes). Check out this web page for more details.  https://www.diabetesfoodhub.org/articles/what-is-the-diabetes-plate-method.html  [employer]plate.gov    2. Try to eat whole foods as much as possible, these are more nutritious with vitamins/minerals and fiber. Look out for added sugar, read nutrition labels and look at the grams of added sugar. Goal is 25g per day or less for women and 35g per day or less for men.    3. Only eat when you are truly hungry and choose whole foods as much as possible. Save sweets and other treats for special occassions.     4. Check out this website for TONS of healthy recipes:  https://www.diabetesfoodhub.org/all-recipes.html    Choose[employer]plate.gov    Snacks  Try to  limit snacking to only when you are truly hungry (not tired, thirsty ,or bored). Here are some snack ideas.. Adding protein or small amount of healthy fat to a snack helps you to feel full longer and eat less. Choose whole grains when able. Also remember non-starchy vegetables are a fantastic snack, add some hummus or dip to go with it.   - Apple or small banana  or celery/carrots with 1-2 tbs nut butter  - 1/2 cup cottage cheese with peach or pear (size of tennis ball)  - 1/2 cup tuna w/shaffer OR 1/2 avocado/guacamole on whole wheat toast OR luis bread OR cut up veggies  - 1/4 cup hummus (and salsa if desired) with ~10 tortilla chips  - 1 cup plain greek yogurt with 1 cup berries  - 4-6oz flavored greek yogurt (look for lower added sugar)  - 1/4 cup nuts or trail mix  - 3 cups popcorn  -1+ cup edamame     GOALS:  Check glucose once daily  Schedule follow up group class within the next 2-3 months    Thank you!  Cheryle Joyner RDN, LD, [facility]   Certified Diabetes [facility] &   [phone]

## 2024-12-11 NOTE — LETTER
12/11/2024         RE: Gina Pearce  [address]  [facility] [place] [address]        Dear Colleague,    Thank you for referring your patient, Gina Pearce, to the [facility]. Please see a copy of my visit note below.    Diabetes Self-Management Education & Support    Presents for: Individual review    Type of Service: Telephone Visit    Originating Location (Patient Location): Home  Distant Location (Provider Location): Offsite  Mode of Communication:  Telephone    Telephone Visit Start Time:  2:31 PM  Telephone Visit End Time (telephone visit stop time): 2:52 PM    How would patient like to obtain AVS? MyChart      ASSESSMENT:    Met with Gina who is 1 week away from graduating from [employer]. She is agreeable to group classes, but is unable to schedule at this time as she will be traveling and be out of state for an unknown amount of time. Follow up order placed for her to schedule when able.  Pt has a glucose meter - has not started using it - discussed goals.  Also discussed metformin, patient is working on increasing this to therapeutic goal of 2,000 mg/day. She is in the process of an appeal for Ozempic.    Reviewed food choices, portion sizes, small weight loss and increased activity - she plans to work on all of this once school is out of session.    Patient's most recent   Lab Results   Component Value Date    A1C 6.7 11/15/2024     is meeting goal of <7.0    Diabetes knowledge and skills assessment:   Patient is knowledgeable in diabetes management concepts related to: Healthy Eating    Continue education with the following diabetes management concepts: Healthy Eating, Being Active, Monitoring, Taking Medication, Problem Solving, Reducing Risks, and Healthy Coping    Based on learning assessment above, most appropriate setting for further diabetes education would be: Group class setting.      PLAN  Schedule group class once back in [place]  Continue  "metformin and increase to goal of 2,000 mg/day  Start checking glucose once a day  Topics to cover at upcoming visits: Healthy Eating, Being Active, Monitoring, Taking Medication, Problem Solving, Reducing Risks, and Healthy Coping    Follow-up: 2-3 months    See Care Plan for co-developed, patient-state behavior change goals.  AVS provided for patient today.    Education Materials Provided:  Mailed information via email on new diagnosis of diabetes      SUBJECTIVE/OBJECTIVE:  Presents for: Individual review  Accompanied by: Self  Diabetes education in the past 24mo: No  Focus of Visit: Patient Unsure  Diabetes type: Type 2  Date of diagnosis: November 2024  Disease course: Stable  How confident are you filling out medical forms by yourself:: Extremely  Transportation concerns: No  Difficulty affording diabetes medication?: No  Difficulty affording diabetes testing supplies?: No  Other concerns:: None  Cultural Influences/Ethnic Background:   or       Diabetes Symptoms & Complications:  Diabetes Related Symptoms: None  Symptom course: Stable  Disease course: Stable  Complications assessed today?: No    Patient Problem List and Family Medical History reviewed for relevant medical history, current medical status, and diabetes risk factors.    Vitals:  LMP 11/24/2024 (Exact Date)   Estimated body mass index is 43.58 kg/m  as calculated from the following:    Height as of 11/25/24: 1.676 m (5' 6\").    Weight as of 11/25/24: 122.5 kg (270 lb).   Last 3 BP:   BP Readings from Last 3 Encounters:   11/25/24 120/82   11/15/24 123/84   07/10/24 115/87       History   Smoking Status     Never   Smokeless Tobacco     Never       Labs:  Lab Results   Component Value Date    A1C 6.7 11/15/2024     Lab Results   Component Value Date    GLC 90 11/25/2024     Lab Results   Component Value Date     07/10/2024     Direct Measure HDL   Date Value Ref Range Status   07/10/2024 36 (L) >=50 mg/dL Final   ]  GFR " "Estimate   Date Value Ref Range Status   11/25/2024 >90 >60 mL/min/1.73m2 Final     Comment:     eGFR calculated using 2021 CKD-EPI equation.     No results found for: \"GFRESTBLACK\"  Lab Results   Component Value Date    CR 0.59 11/25/2024     No results found for: \"MICROALBUMIN\"    Healthy Eating:  Healthy Eating Assessed Today: Yes  Beverages: Water    Being Active:  Being Active Assessed Today: No  Exercise:: Currently not exercising    Monitoring:  Monitoring Assessed Today: No  Did patient bring glucose meter to appointment? : Yes  Blood Glucose Meter: Unknown  Times checking blood sugar at home (number): Never        Taking Medications:  Diabetes Medication(s)       Biguanides       metFORMIN (GLUCOPHAGE XR) 500 MG 24 hr tablet Take 1 tablet (500 mg) by mouth daily for 7 days, THEN 2 tablets (1,000 mg) daily for 7 days, THEN 3 tablets (1,500 mg) daily for 7 days, THEN 4 tablets (2,000 mg) daily.       Incretin Mimetic Agents       MOUNJARO 2.5 MG/0.5ML SOAJ Inject 0.5 mLs (2.5 mg) subcutaneously every 7 days.     Patient not taking: Reported on 12/11/2024     semaglutide (OZEMPIC) 2 MG/3ML pen Inject 0.25 mg subcutaneously every 7 days for 28 days, THEN 0.5 mg every 7 days for 14 days.     Patient not taking: Reported on 12/11/2024            Taking Medication Assessed Today: Yes  Current Treatments: Oral Medication (taken by mouth)  Problems taking diabetes medications regularly?: No  Diabetes medication side effects?: No    Problem Solving:  Problem Solving Assessed Today: No              Reducing Risks:  Reducing Risks Assessed Today: No    Healthy Coping:  Healthy Coping Assessed Today: Yes  Emotional response to diabetes: Ready to learn  Stage of change: ACTION (Actively working towards change)  Patient Activation Measure Survey Score:       No data to display                  Care Plan and Education Provided:  Healthy Eating: Balanced meals and Portion control, Being Active: Finding a physical activity " routine that works for you, Taking Medication: Side effects of prescribed medication(s) and When to take medication(s), and Healthy Coping: Benefits of making appropriate lifestyle changes      Time Spent: 21 minutes  Encounter Type: Individual    Any diabetes medication dose changes were made via the CDE Protocol per the patient's referring provider. A copy of this encounter was shared with the provider.

## 2024-12-17 NOTE — TELEPHONE ENCOUNTER
Letter was already sent to [facility] 12/6/24. Will re-fax it again today    Valarie MCKEON

## 2024-12-17 NOTE — TELEPHONE ENCOUNTER
MEDICATION APPEAL DENIED    Medication: OZEMPIC (0.25 OR 0.5 MG/DOSE) 2 MG/3ML SC SOPN  Insurance Company: [employer]  Denial Date: 12/16/2024  Denial Reason(s):     Second Level Appeal Information:     Patient Notified: The clinic should notify the patient of the denial.  Central Prior Authorization Team ONLY: Second level appeals will be managed by the clinic staff and provider. Please contact the [facility] Prior Authorization Team if additional information about the denial is needed.

## 2025-01-16 ENCOUNTER — MYC REFILL (OUTPATIENT)
Dept: FAMILY MEDICINE | Facility: CLINIC | Age: 25
End: 2025-01-16
Payer: COMMERCIAL

## 2025-01-16 DIAGNOSIS — E11.9 TYPE 2 DIABETES MELLITUS WITHOUT COMPLICATION, WITHOUT LONG-TERM CURRENT USE OF INSULIN (H): ICD-10-CM

## 2025-01-16 RX ORDER — METFORMIN HYDROCHLORIDE 500 MG/1
2000 TABLET, EXTENDED RELEASE ORAL
Qty: 120 TABLET | Refills: 5 | Status: SHIPPED | OUTPATIENT
Start: 2025-01-16

## 2025-01-16 RX ORDER — METFORMIN HYDROCHLORIDE 500 MG/1
TABLET, EXTENDED RELEASE ORAL
Qty: 360 TABLET | Refills: 0 | Status: CANCELLED | OUTPATIENT
Start: 2025-01-16 | End: 2025-04-16

## 2025-01-30 DIAGNOSIS — D50.8 IRON DEFICIENCY ANEMIA SECONDARY TO INADEQUATE DIETARY IRON INTAKE: ICD-10-CM

## 2025-01-30 DIAGNOSIS — E28.2 PCOS (POLYCYSTIC OVARIAN SYNDROME): ICD-10-CM

## 2025-01-30 RX ORDER — FERROUS GLUCONATE 324(38)MG
324 TABLET ORAL
Qty: 90 TABLET | Refills: 2 | Status: SHIPPED | OUTPATIENT
Start: 2025-01-30

## 2025-01-30 RX ORDER — DROSPIRENONE AND ETHINYL ESTRADIOL 0.02-3(28)
1 KIT ORAL DAILY
Qty: 84 TABLET | Refills: 2 | Status: SHIPPED | OUTPATIENT
Start: 2025-01-30

## 2025-01-30 NOTE — TELEPHONE ENCOUNTER
Pharmacy location change request. Pt is currently out of town - looking for refills at local [facility]. Meds pended with updated pharmacy location.    Cheryle HOLCOMB RN

## 2025-02-16 ENCOUNTER — HEALTH MAINTENANCE LETTER (OUTPATIENT)
Age: 25
End: 2025-02-16

## 2025-05-18 ENCOUNTER — HEALTH MAINTENANCE LETTER (OUTPATIENT)
Age: 25
End: 2025-05-18

## 2025-08-31 ENCOUNTER — HEALTH MAINTENANCE LETTER (OUTPATIENT)
Age: 25
End: 2025-08-31